# Patient Record
Sex: MALE | Race: WHITE
[De-identification: names, ages, dates, MRNs, and addresses within clinical notes are randomized per-mention and may not be internally consistent; named-entity substitution may affect disease eponyms.]

---

## 2017-03-06 NOTE — CT
CT OF THE CERVICAL SPINE PERFORMED WITHOUT CONTRAST ENHANCEMENT:

 

HISTORY: 

Fall with neck pain.

 

FINDINGS: 

The vertebral bodies are normal in height.  There is disk narrowing at C5-6 and C6-7 and C7-T1.  The
re are moderate degenerative facet changes also present.  There is marked right-sided foraminal narr
owing at C3-4.  Mild bilateral foraminal narrowing at C4-5 and C5-6.  There is fairly pronounced rig
ht-sided foraminal narrowing at C7-T1.  There is no CT evidence of fracture.

 

There are mildly prominent but small bilateral jugular chain lymph nodes, also bilateral intraparoti
d nodes which are not significantly enlarged.  Given the lack of any pathologically enlarged nodes, 
this is of questionable significance.  If the patient has any type of history of cancer, particularl
y a lymphoma,, then they would have to be viewed with more suspicion.

 

IMPRESSION: 

1.  No CT evidence of fracture of the cervical spine.

2.  Slightly numerous nodes including submandibular intraparotid, and jugular chain and posterior tr
iangle nodes, none of which appear pathologically enlarged.

 

POS: [facility]

## 2017-03-06 NOTE — RAD
TWO VIEWS LEFT RIBS

 

ONE VIEW CHEST

 

HISTORY:

A 66-year-old male with left rib pain after a fall out of bed four days ago.

 

FINDINGS:

Heart size is normal.  Nipple shadows overly both lower lung zones.  No pneumothorax or pleural effu
justin.  Nondisplaced fracture of the left ninth lateral rib.

 

IMPRESSION:

Nondisplaced fracture of the left lateral ninth rib.

 

POS: [facility]

## 2017-03-06 NOTE — CT
CT BRAIN WITHOUT CONTRAST:

 

Date:

03/06/17 

 

HISTORY:  

Fall, hit head on nightstand, headache. 

 

FINDINGS:

No evidence of acute infarct, hemorrhage, midline shift, or abnormal extra-axial fluid collections a
re seen. The ventricular size is appropriate and the basilar cisterns are patent. The bony calvarium
 is intact. The visualized paranasal sinuses and mastoid air cells are well aerated. 

 

IMPRESSION: 

No CT evidence of acute intracranial process. 

 

 

 

POS: [facility]

## 2019-01-17 NOTE — CT
CONTRAST ENHANCED CT IMAGES SOFT TISSUE NECK:

 

HISTORY: 

Anterior neck swelling.

 

FINDINGS: 

Contrast-enhanced CT images of the soft tissue neck obtained.  Comparison is made to a previous CT of
 the cervical spine from 3/6/2017.

 

CT images soft tissue neck demonstrate a hypodense mass just to the right of midline seen on axial im
age #76.  Three-dimensional measures measure 2.2 x 4.5 x 3.2 cm.  This lesion appears to be hypodense
.  It is posterior to the strap muscles.  It extends superiorly to the inferior margin of the vallecu
la.  This lesion may represent a thyroglossal duct cyst.  The lesion is posterior to the strap muscle
s and anterior to the thyroid cartilage and inferior to the hyoid bone.  Cystic malignancy including 
squamous cell carcinoma, however, cannot be excluded originating from the vallecula.  No significant 
evidence of lymphadenopathy is seen.  There is an enlarged lymph node in the left superficial posteri
or aspect of the parotid gland measuring 7 mm in minimum dimension.

 

IMPRESSION: 

Midline intrahyoid hypodense lesion possibly representing a thyroglossal duct cyst.  Other cystic gio
plastic process, however, cannot be excluded.  Coronary artery calcifications seen.  Some prominent s
uperficial left parotid lymph nodes seen.  In addition, left carotid bulb vascular calcifications are
 also seen.

 

POS: [facility]

## 2019-02-21 ENCOUNTER — HOSPITAL ENCOUNTER (OUTPATIENT)
Dept: HOSPITAL 92 - SDC | Age: 69
Discharge: HOME | End: 2019-02-21
Attending: OTOLARYNGOLOGY
Payer: MEDICARE

## 2019-02-21 VITALS — BODY MASS INDEX: 33.9 KG/M2

## 2019-02-21 DIAGNOSIS — Z79.1: ICD-10-CM

## 2019-02-21 DIAGNOSIS — Z79.899: ICD-10-CM

## 2019-02-21 DIAGNOSIS — Z79.4: ICD-10-CM

## 2019-02-21 DIAGNOSIS — I10: ICD-10-CM

## 2019-02-21 DIAGNOSIS — E11.40: ICD-10-CM

## 2019-02-21 DIAGNOSIS — Z88.8: ICD-10-CM

## 2019-02-21 DIAGNOSIS — Z21: ICD-10-CM

## 2019-02-21 DIAGNOSIS — Z79.82: ICD-10-CM

## 2019-02-21 DIAGNOSIS — Q89.2: Primary | ICD-10-CM

## 2019-02-21 DIAGNOSIS — Z86.73: ICD-10-CM

## 2019-02-21 LAB
ANION GAP SERPL CALC-SCNC: 14 MMOL/L (ref 10–20)
BUN SERPL-MCNC: 11 MG/DL (ref 8.4–25.7)
CALCIUM SERPL-MCNC: 9.3 MG/DL (ref 7.8–10.44)
CHLORIDE SERPL-SCNC: 105 MMOL/L (ref 98–107)
CO2 SERPL-SCNC: 20 MMOL/L (ref 23–31)
CREAT CL PREDICTED SERPL C-G-VRATE: 116 ML/MIN (ref 70–130)
GLUCOSE SERPL-MCNC: 325 MG/DL (ref 80–115)
HGB BLD-MCNC: 9.6 G/DL (ref 14–18)
PLATELET # BLD AUTO: 115 THOU/UL (ref 130–400)
POTASSIUM SERPL-SCNC: 4.4 MMOL/L (ref 3.5–5.1)
SODIUM SERPL-SCNC: 135 MMOL/L (ref 136–145)

## 2019-02-21 PROCEDURE — 93005 ELECTROCARDIOGRAM TRACING: CPT

## 2019-02-21 PROCEDURE — 93010 ELECTROCARDIOGRAM REPORT: CPT

## 2019-02-21 PROCEDURE — 85018 HEMOGLOBIN: CPT

## 2019-02-21 PROCEDURE — 88305 TISSUE EXAM BY PATHOLOGIST: CPT

## 2019-02-21 PROCEDURE — 85049 AUTOMATED PLATELET COUNT: CPT

## 2019-02-21 PROCEDURE — 85014 HEMATOCRIT: CPT

## 2019-02-21 PROCEDURE — 36415 COLL VENOUS BLD VENIPUNCTURE: CPT

## 2019-02-21 PROCEDURE — 80048 BASIC METABOLIC PNL TOTAL CA: CPT

## 2019-02-21 PROCEDURE — 0WB60ZX EXCISION OF NECK, OPEN APPROACH, DIAGNOSTIC: ICD-10-PCS | Performed by: OTOLARYNGOLOGY

## 2019-02-21 PROCEDURE — 36416 COLLJ CAPILLARY BLOOD SPEC: CPT

## 2019-02-21 NOTE — OP
DATE OF PROCEDURE:  02/21/2019



PREOPERATIVE DIAGNOSIS:  Large thyroglossal duct cyst.



POSTOPERATIVE DIAGNOSIS:  Large thyroglossal duct cyst.



PROCEDURES PERFORMED:  

1. Excision of large thyroglossal duct cyst.

2. Direct laryngoscopy.

3. Sistrunk procedure.



DESCRIPTION OF PROCEDURE:  After consent was obtained, the patient was identified

and brought to operating room and placed on operating table in supine position.

General endotracheal anesthesia was obtained and the patient was positioned for

surgery.  The patient underwent systematic laryngeal examination and no foreign

bodies were identified at the cecum or the tongue base area.  We then proceeded with

prepping and draping the patient and positioning for surgery.  An incision was made

over the cyst in the natural skin crease and carried down through the skin and

subcutaneous tissues until the strap muscles.  Ultimately, the cyst was encountered

and dissected from the surrounding tissues.  The hyoid bone was skeletonized and

transected in the medial portion.  This was then dissected through the midportion of

the hyoid bone back to the base of tongue area.  This was then suture ligated with a

3-0 silk suture.  The marsupialized end of the cyst was then cauterized with the

bipolar cautery.  We then put Fibrillar Surgicel in the deep aspect of the wound,

closed the wounds with the strap muscles being reapproximated followed by the

platysma and subcutaneous tissues.  We then did a subcuticular suture and put a

sterile dressing, awakened the patient, taken to the recovery room in stable

condition prior to discharge to home. 







Job ID:  [id number]

## 2019-02-22 NOTE — EKG
Test Reason : PREOP

Blood Pressure : ***/*** mmHG

Vent. Rate : 100 BPM     Atrial Rate : 100 BPM

   P-R Int : 138 ms          QRS Dur : 098 ms

    QT Int : 364 ms       P-R-T Axes : 048 -27 071 degrees

   QTc Int : 469 ms

 

Normal sinus rhythm

Normal ECG

When compared with ECG of 13-JAN-2017 18:04,

No significant change was found

Confirmed by WILBERT AGUILAR ([id number]) on 2/22/2019 7:05:38 AM

 

Referred By:  LEAH           Confirmed By:WILBERT AGUILAR

## 2019-04-23 ENCOUNTER — HOSPITAL ENCOUNTER (OUTPATIENT)
Dept: HOSPITAL 92 - SDC | Age: 69
Discharge: HOME | End: 2019-04-23
Attending: INTERNAL MEDICINE
Payer: MEDICARE

## 2019-04-23 VITALS — BODY MASS INDEX: 34.2 KG/M2

## 2019-04-23 DIAGNOSIS — K63.5: Primary | ICD-10-CM

## 2019-04-23 DIAGNOSIS — D50.9: ICD-10-CM

## 2019-04-23 DIAGNOSIS — B20: ICD-10-CM

## 2019-04-23 DIAGNOSIS — K72.90: ICD-10-CM

## 2019-04-23 DIAGNOSIS — K57.30: ICD-10-CM

## 2019-04-23 DIAGNOSIS — I10: ICD-10-CM

## 2019-04-23 DIAGNOSIS — Z88.8: ICD-10-CM

## 2019-04-23 DIAGNOSIS — E11.9: ICD-10-CM

## 2019-04-23 DIAGNOSIS — K74.60: ICD-10-CM

## 2019-04-23 PROCEDURE — 88305 TISSUE EXAM BY PATHOLOGIST: CPT

## 2019-04-23 PROCEDURE — 0DBE8ZZ EXCISION OF LARGE INTESTINE, VIA NATURAL OR ARTIFICIAL OPENING ENDOSCOPIC: ICD-10-PCS | Performed by: INTERNAL MEDICINE

## 2019-04-23 PROCEDURE — 0DB58ZX EXCISION OF ESOPHAGUS, VIA NATURAL OR ARTIFICIAL OPENING ENDOSCOPIC, DIAGNOSTIC: ICD-10-PCS | Performed by: INTERNAL MEDICINE

## 2019-04-23 PROCEDURE — 36416 COLLJ CAPILLARY BLOOD SPEC: CPT

## 2019-04-23 NOTE — OP
DATE OF PROCEDURE:  04/23/2019



PROCEDURE PERFORMED:  Esophagogastroduodenoscopy with biopsy and colonoscopy with

snare polypectomy. 



PREOPERATIVE DIAGNOSES:  Iron deficiency anemia and cirrhosis of the liver.



DESCRIPTION OF PROCEDURE:  Informed consent was obtained from the patient.  He was

sedated with total intravenous anesthesia.  The bite block was placed and the

endoscope was advanced easily to the second portion of the duodenum and retroflexion

was performed in the stomach.  The esophagus was normal.  The GE junction was

normal.  The stomach was normal including retroflex views.  The pylorus and first

and second portions of the duodenum were normal.  Biopsies were obtained from the

duodenum to rule out celiac disease.  The patient was turned around.  Rectal exam

was performed and was normal.  The colonoscope was advanced to the terminal ileum

without difficulty.  The mucosa of the terminal ileum was normal.  The ileocecal

valve and appendiceal orifice were clearly identified.  There was mild

diverticulosis in the left colon.  A 5-mm polyp was removed from the descending

colon by cold snare polypectomy.  The remainder of the colonic mucosa was normal

throughout.  Retroflex views in the rectum were unremarkable unremarkable. 



IMPRESSION:  

1. Normal esophagogastroduodenoscopy.  There were no varices.  Duodenal biopsies

were taken to rule out celiac disease as a cause for iron deficiency anemia. 

2. Mild diverticulosis of the left colon.

3. A 5-mm polyp was removed from the descending colon with cold snare polypectomy.

4. Otherwise normal colonoscopy to the terminal ileum.



RECOMMENDATIONS:  

1. Await histopathology.

2. Follow up in GI clinic.

3. Consider capsule endoscopy as an outpatient as a next step.





Job ID:  [id number]

## 2019-06-24 ENCOUNTER — HOSPITAL ENCOUNTER (OUTPATIENT)
Dept: HOSPITAL 18 - NAV ULT | Age: 69
Discharge: HOME | End: 2019-06-24
Payer: MEDICARE

## 2019-06-24 DIAGNOSIS — K74.60: ICD-10-CM

## 2019-06-24 DIAGNOSIS — K72.90: Primary | ICD-10-CM

## 2019-06-24 DIAGNOSIS — Z90.49: ICD-10-CM

## 2019-06-24 PROCEDURE — 76705 ECHO EXAM OF ABDOMEN: CPT

## 2019-06-24 NOTE — ULT
RIGHT UPPER QUADRANT ULTRASOUND:

 

HISTORY: 

Cirrhosis of the liver.

 

FINDINGS: 

The liver demonstrates increased echogenicity concerning for fatty infiltration without focal mass or
 intrahepatic ductal dilatation.  The patient is post cholecystectomy.  The common duct measures 7 mm
 in diameter.  The visualized portions of the pancreas and right kidney are normal.  There is normal 
flow and spectral waveforms in the portal and hepatic vasculature.  No free fluid is seen in the righ
t upper quadrant.

 

IMPRESSION: 

1.  Fatty liver without hepatic mass.

 

2.  Status post cholecystectomy.

 

POS: OFF

## 2019-10-25 ENCOUNTER — HOSPITAL ENCOUNTER (OUTPATIENT)
Dept: HOSPITAL 92 - SCSULT | Age: 69
Discharge: HOME | End: 2019-10-25
Attending: INTERNAL MEDICINE
Payer: MEDICARE

## 2019-10-25 DIAGNOSIS — D50.9: ICD-10-CM

## 2019-10-25 DIAGNOSIS — K72.90: ICD-10-CM

## 2019-10-25 DIAGNOSIS — R16.2: ICD-10-CM

## 2019-10-25 DIAGNOSIS — K76.0: ICD-10-CM

## 2019-10-25 DIAGNOSIS — B20: ICD-10-CM

## 2019-10-25 DIAGNOSIS — E11.9: ICD-10-CM

## 2019-10-25 DIAGNOSIS — K74.60: Primary | ICD-10-CM

## 2019-10-25 DIAGNOSIS — Z90.49: ICD-10-CM

## 2019-10-25 PROCEDURE — 76705 ECHO EXAM OF ABDOMEN: CPT

## 2019-10-25 NOTE — ULT
EXAM:

US Hepatic Doppler



PROVIDED CLINICAL HISTORY:

Cirrhosis. History of prior cholecystectomy.



COMPARISON:

None



FINDINGS:

The majority of pancreas is obscured by bowel gas; however, where visualized, pancreas has a grossly 
normal sonographic appearance. Abdominal aorta is also mostly obscured due to shadowing from bowel

gas. The mid abdominal aorta is visualized and normal in caliber. 



There is increased echogenicity of the liver suggesting fatty infiltration. The liver is enlarged bravo
suring 21.3 cm in craniocaudal dimensions. No obvious hepatic lesion is seen.



The gallbladder is not visualized compatible with patient's history of prior cholecystectomy. The com
mon duct is normal in caliber measuring 0.3 cm in diameter.



The spleen is enlarged measuring 17 cm in length.



Hepatic Doppler evaluation with spectral analysis and color flow evaluation:

There is normal directional flow seen within the hepatic, portal, and splenic veins. Arterial wavefor
ms are demonstrated within the splenic and hepatic arteries.



IMPRESSION:



1. Hepatosplenomegaly.

2. Fatty infiltration of the liver which does limit evaluation of the hepatic parenchyma. However, no
 definite lesion is seen.

3. Postcholecystectomy changes. The common duct is normal in caliber.

4. Normal directional flow with thin the splenic, hepatic, and portal veins.



Reported By: Herve Haq 

Electronically Signed:  10/25/2019 9:32 AM

## 2019-10-31 ENCOUNTER — HOSPITAL ENCOUNTER (OUTPATIENT)
Dept: HOSPITAL 92 - SCSRAD | Age: 69
Discharge: HOME | End: 2019-10-31
Attending: INTERNAL MEDICINE
Payer: MEDICARE

## 2019-10-31 DIAGNOSIS — Z90.49: ICD-10-CM

## 2019-10-31 DIAGNOSIS — D64.9: Primary | ICD-10-CM

## 2019-10-31 DIAGNOSIS — K59.00: ICD-10-CM

## 2019-10-31 PROCEDURE — 74018 RADEX ABDOMEN 1 VIEW: CPT

## 2019-10-31 NOTE — RAD
ABDOMEN ONE VIEW:

10/31/19

 

HISTORY: 

Capsule endoscopy. 

 

FINDINGS:

Large amount of stool throughout the colon. Phleboliths project over the pelvis. Metallic clips over 
the gallbladder fossa. No metallic foreign bodies are otherwise demonstrated. Degenerative changes of
 the hips and lumbar spine. 

 

IMPRESSION: 

Metallic camera not visible within the abdomen. 

 

Constipation. 

 

Status post cholecystectomy. 

 

POS: [facility]

## 2019-11-14 ENCOUNTER — HOSPITAL ENCOUNTER (INPATIENT)
Dept: HOSPITAL 92 - ERS | Age: 69
LOS: 3 days | Discharge: HOME | DRG: 280 | End: 2019-11-17
Attending: FAMILY MEDICINE | Admitting: FAMILY MEDICINE
Payer: MEDICARE

## 2019-11-14 ENCOUNTER — HOSPITAL ENCOUNTER (EMERGENCY)
Dept: HOSPITAL 18 - NAV ERS | Age: 69
Discharge: ANOTHER HOSPITAL | End: 2019-11-14
Payer: MEDICARE

## 2019-11-14 VITALS — BODY MASS INDEX: 37.9 KG/M2

## 2019-11-14 DIAGNOSIS — Z79.899: ICD-10-CM

## 2019-11-14 DIAGNOSIS — E11.22: ICD-10-CM

## 2019-11-14 DIAGNOSIS — E78.5: ICD-10-CM

## 2019-11-14 DIAGNOSIS — I11.0: ICD-10-CM

## 2019-11-14 DIAGNOSIS — K75.81: ICD-10-CM

## 2019-11-14 DIAGNOSIS — K74.60: ICD-10-CM

## 2019-11-14 DIAGNOSIS — D64.9: ICD-10-CM

## 2019-11-14 DIAGNOSIS — Z90.49: ICD-10-CM

## 2019-11-14 DIAGNOSIS — Z66: ICD-10-CM

## 2019-11-14 DIAGNOSIS — K21.9: ICD-10-CM

## 2019-11-14 DIAGNOSIS — Z21: ICD-10-CM

## 2019-11-14 DIAGNOSIS — Z85.46: ICD-10-CM

## 2019-11-14 DIAGNOSIS — Z90.79: ICD-10-CM

## 2019-11-14 DIAGNOSIS — N18.2: ICD-10-CM

## 2019-11-14 DIAGNOSIS — I50.31: ICD-10-CM

## 2019-11-14 DIAGNOSIS — Z79.4: ICD-10-CM

## 2019-11-14 DIAGNOSIS — I13.0: ICD-10-CM

## 2019-11-14 DIAGNOSIS — N17.9: ICD-10-CM

## 2019-11-14 DIAGNOSIS — I25.10: ICD-10-CM

## 2019-11-14 DIAGNOSIS — Z79.82: ICD-10-CM

## 2019-11-14 DIAGNOSIS — I21.4: Primary | ICD-10-CM

## 2019-11-14 DIAGNOSIS — Z79.84: ICD-10-CM

## 2019-11-14 DIAGNOSIS — I50.9: ICD-10-CM

## 2019-11-14 DIAGNOSIS — J96.01: ICD-10-CM

## 2019-11-14 DIAGNOSIS — E66.9: ICD-10-CM

## 2019-11-14 DIAGNOSIS — E11.42: ICD-10-CM

## 2019-11-14 LAB
ALBUMIN SERPL BCG-MCNC: 4.2 G/DL (ref 3.4–4.8)
ALP SERPL-CCNC: 112 U/L (ref 40–110)
ALT SERPL W P-5'-P-CCNC: 31 U/L (ref 8–55)
ANION GAP SERPL CALC-SCNC: 17 MMOL/L (ref 10–20)
APTT PPP: 29.8 SEC (ref 22.9–36.1)
AST SERPL-CCNC: 31 U/L (ref 5–34)
BASOPHILS # BLD AUTO: 0.1 THOU/UL (ref 0–0.2)
BASOPHILS NFR BLD AUTO: 0.7 % (ref 0–1)
BILIRUB SERPL-MCNC: 0.5 MG/DL (ref 0.2–1.2)
BUN SERPL-MCNC: 30 MG/DL (ref 8.4–25.7)
CALCIUM SERPL-MCNC: 9.6 MG/DL (ref 7.8–10.44)
CHLORIDE SERPL-SCNC: 105 MMOL/L (ref 98–107)
CK MB SERPL-MCNC: 14.6 NG/ML (ref 0–6.6)
CO2 SERPL-SCNC: 19 MMOL/L (ref 23–31)
CREAT CL PREDICTED SERPL C-G-VRATE: 0 ML/MIN (ref 70–130)
EOSINOPHIL # BLD AUTO: 0.1 THOU/UL (ref 0–0.7)
EOSINOPHIL NFR BLD AUTO: 0.7 % (ref 0–10)
GLOBULIN SER CALC-MCNC: 3.6 G/DL (ref 2.4–3.5)
GLUCOSE SERPL-MCNC: 66 MG/DL (ref 80–115)
HELMET CELLS BLD QL SMEAR: (no result) (100X)
HGB BLD-MCNC: 7.6 G/DL (ref 14–18)
INR PPP: 1.1
LYMPHOCYTES # BLD AUTO: 1.2 THOU/UL (ref 1.2–3.4)
LYMPHOCYTES NFR BLD AUTO: 11.2 % (ref 21–51)
MCH RBC QN AUTO: 18.1 PG (ref 27–31)
MCV RBC AUTO: 63.7 FL (ref 78–98)
MDIFF COMPLETE?: YES
MICROCYTES BLD QL SMEAR: (no result) (100X)
MONOCYTES # BLD AUTO: 0.7 THOU/UL (ref 0.11–0.59)
MONOCYTES NFR BLD AUTO: 6.1 % (ref 0–10)
NEUTROPHILS # BLD AUTO: 8.8 THOU/UL (ref 1.4–6.5)
NEUTROPHILS NFR BLD AUTO: 81.2 % (ref 42–75)
OVALOCYTES BLD QL SMEAR: (no result) (100X)
PLATELET # BLD AUTO: 143 THOU/UL (ref 130–400)
POTASSIUM SERPL-SCNC: 4 MMOL/L (ref 3.5–5.1)
PROTHROMBIN TIME: 13.9 SEC (ref 12–14.7)
RBC # BLD AUTO: 4.17 MILL/UL (ref 4.7–6.1)
REFLEX FOR REVIEW??: NO
SODIUM SERPL-SCNC: 137 MMOL/L (ref 136–145)
TROPONIN I SERPL DL<=0.01 NG/ML-MCNC: 1.28 NG/ML (ref ?–0.03)
WBC # BLD AUTO: 10.8 THOU/UL (ref 4.8–10.8)

## 2019-11-14 PROCEDURE — 90670 PCV13 VACCINE IM: CPT

## 2019-11-14 PROCEDURE — 80053 COMPREHEN METABOLIC PANEL: CPT

## 2019-11-14 PROCEDURE — 99285 EMERGENCY DEPT VISIT HI MDM: CPT

## 2019-11-14 PROCEDURE — 36416 COLLJ CAPILLARY BLOOD SPEC: CPT

## 2019-11-14 PROCEDURE — 86361 T CELL ABSOLUTE COUNT: CPT

## 2019-11-14 PROCEDURE — 71045 X-RAY EXAM CHEST 1 VIEW: CPT

## 2019-11-14 PROCEDURE — 86901 BLOOD TYPING SEROLOGIC RH(D): CPT

## 2019-11-14 PROCEDURE — 36415 COLL VENOUS BLD VENIPUNCTURE: CPT

## 2019-11-14 PROCEDURE — 85007 BL SMEAR W/DIFF WBC COUNT: CPT

## 2019-11-14 PROCEDURE — 83036 HEMOGLOBIN GLYCOSYLATED A1C: CPT

## 2019-11-14 PROCEDURE — 93306 TTE W/DOPPLER COMPLETE: CPT

## 2019-11-14 PROCEDURE — 96372 THER/PROPH/DIAG INJ SC/IM: CPT

## 2019-11-14 PROCEDURE — 85730 THROMBOPLASTIN TIME PARTIAL: CPT

## 2019-11-14 PROCEDURE — 87536 HIV-1 QUANT&REVRSE TRNSCRPJ: CPT

## 2019-11-14 PROCEDURE — 85610 PROTHROMBIN TIME: CPT

## 2019-11-14 PROCEDURE — 86850 RBC ANTIBODY SCREEN: CPT

## 2019-11-14 PROCEDURE — 84484 ASSAY OF TROPONIN QUANT: CPT

## 2019-11-14 PROCEDURE — 94760 N-INVAS EAR/PLS OXIMETRY 1: CPT

## 2019-11-14 PROCEDURE — 83880 ASSAY OF NATRIURETIC PEPTIDE: CPT

## 2019-11-14 PROCEDURE — G0009 ADMIN PNEUMOCOCCAL VACCINE: HCPCS

## 2019-11-14 PROCEDURE — 85027 COMPLETE CBC AUTOMATED: CPT

## 2019-11-14 PROCEDURE — 85025 COMPLETE CBC W/AUTO DIFF WBC: CPT

## 2019-11-14 PROCEDURE — 86900 BLOOD TYPING SEROLOGIC ABO: CPT

## 2019-11-14 PROCEDURE — 85048 AUTOMATED LEUKOCYTE COUNT: CPT

## 2019-11-14 PROCEDURE — 36430 TRANSFUSION BLD/BLD COMPNT: CPT

## 2019-11-14 PROCEDURE — 93005 ELECTROCARDIOGRAM TRACING: CPT

## 2019-11-14 PROCEDURE — P9016 RBC LEUKOCYTES REDUCED: HCPCS

## 2019-11-14 PROCEDURE — 82553 CREATINE MB FRACTION: CPT

## 2019-11-14 PROCEDURE — 90471 IMMUNIZATION ADMIN: CPT

## 2019-11-14 PROCEDURE — 93798 PHYS/QHP OP CAR RHAB W/ECG: CPT

## 2019-11-14 PROCEDURE — 82140 ASSAY OF AMMONIA: CPT

## 2019-11-14 NOTE — RAD
FRONTAL VIEW CHEST:

11/14/19

 

COMPARISON: 

7/5/16. 

 

INDICATIONS:

Short of breath.

 

FINDINGS: 

There is interstitial prominence of the lungs bilaterally. Cardiac silhouette is prominent as is pulm
onary vasculature.  There is obscuration of the left lateral costophrenic sulcus. This could relate t
o small volume pleural fluid.

 

IMPRESSION: 

Findings favor fluid overload related to CHF. Correlate clinically. Imaging follow-up may be obtained
 for continued assessment. 

 

POS: OFF

## 2019-11-14 NOTE — PDOC.FPRHP
- History of Present Illness


Chief Complaint: CP


History of Present Illness: 


68yo CM with h/o CAD, DMII, HARRIS, HIV who presents for CP and SOB. Pt states he 

has had about 3 days of UE and LE edema. Then noticed yesterday he had 

increased SOB having to use his walker to get around. Then noticed new onset 

substernal chest pain described as a pressure and radiation down his L arm. He 

has not been able to lie down all night. Could not move more than 5 feet 

without gasping for air. Associated diaphoresis and flushing. Does endorse some 

dizziness/lightheadedness. CP has since resolved and SOB and edema improved 

with lasix given in ED.





Pt reports being chronically anemic. Has been seeing Dr. Ruddy Adair with GI. 

Reports doing upper and lower endoscopy and recently capsule endoscopy. has not 

found anything


Cardiologist- Dr. Sam


ED Course: 


Given ASA. Th lovenox. 








- Allergies/Adverse Reactions


 Allergies











Allergy/AdvReac Type Severity Reaction Status Date / Time


 


nitroglycerin Allergy  has Verified 04/22/19 14:13





   opposite  





   affect  














- Home Medications


 











 Medication  Instructions  Recorded  Confirmed  Type


 


Meloxicam [Mobic] 15 mg PO HS 07/05/16 11/15/19 History


 


Omeprazole 20 mg PO DAILY 07/05/16 11/15/19 History


 


hydrOXYzine Pamoate [Vistaril] 40 mg PO HS 07/05/16 11/15/19 History


 


metFORMIN [Glucophage] 500 mg PO BID- 07/05/16 11/15/19 History


 


Aspirin [Ecotrin] 81 mg PO DAILY 02/20/19 11/15/19 History


 


Darunavir Ethanolate [Prezista] 1 tab PO DAILY 02/20/19 11/15/19 History


 


Gabapentin 1 tab PO BID 02/20/19 11/15/19 History


 


HumaLOG 50 unit SC BID 02/20/19 11/15/19 History


 


Icosapent Ethyl [Vascepa] 2 tab PO DAILY 02/20/19 11/15/19 History


 


Insulin Glargine [Lantus Vial] 70 units SC BID 02/20/19 11/15/19 History


 


Lactulose 10 gm PO TID 02/20/19 11/15/19 History


 


Raltegravir Potassium [Isentress] 400 mg PO BID 02/20/19 11/15/19 History


 


Rifaximin [Xifaxan] 550 mg PO BID 02/20/19 04/22/19 History


 


Ritonavir 100 mg PO DAILY 02/20/19 11/15/19 History











Comments: 





70 lantus morning and night





Generlac 10g/15ml (20mg after meals)


Vascepa 1 gm BID


Gabapentin 600mg BID


Omeprazole 20 mg once daily


Amitriptyline 100mg in am 150 mg in pm


Metformin 500mg BID


Atorvastatin 40 mg daily


Ritonavir 100 mg once a day


Isentress 400 mg BID


Escitalopram 20 mg a day


ASA 81 mg daily


Alprazolam .5mg daily


Prezista 800 mg daily


Hydroxyzine Pamoate 40 mg a day


Meloxicam 15 mg at night


Humalog 


Lantus





- History


PMHx: Chronic Anemia, Heart Cath (2018) had one vessel completely occluded (no 

stents), DMII on insulin, HTN, HLD, Cirrhosis 2/2 fatty liver, HIV


 


PSHx: Heart Cath (2018), Prostatectomy for Prostate Cancer, Cholecystectomy, 

Tonsilectomy, Hemorrhoidectomy





FHx: Heart Dz, Dad- Pancreatic, Prostate Cancer


 


Social: social drinker. no tob or illicits. Lives alone in [place].


 








- Review of Systems


General: reports: weight/appetite/sleep changes (unable to sleep last night), 

fatigue.  denies: fever/chills, night sweats


Eyes: reports: vision changes (reports blurry vision).  denies: eye pain


ENT: denies: nasal congestion, rhinorrhea


Respiratory: reports: cough, shortness of breath, exercise intolerance.  denies

: congestion


Cardiovascular: reports: chest pain, edema (in arms and legs a few days ago), 

paroxysmal nocturnal dyspnea, orthopnea.  denies: palpitation


Gastrointestinal: reports: constipation (chronic problem).  denies: vomiting, 

diarrhea, abdominal pain, GI bleeding


Genitourinary: reports: other (reports orange colored urine).  denies: 

incontinence, dysuria, polyuria


Skin: denies: rashes, lesions, jaundice


Musculoskeletal: denies: pain, tenderness, stiffness, swelling


Neurological: denies: numbness, weakness


Psychological: denies: anxiety, depression





- Vital signs


BP: [170/80]  HR: [102] RR: [18] Tmax: [98.1] Pox: [100]% on [3L]  Wt: [124 kg]

   








- Physical Exam


Constitutional: NAD, awake, alert and oriented, well developed


HEENT: normocephalic and atraumatic, EOMI, conjunctiva clear, grossly normal 

vision, grossly normal hearing, MMM, oropharynx clear


Neck: supple, trachea midline, no LAD, no JVD


Chest: no-tender to palpation


Heart: RRR, normal S1/S2, no murmurs/rubs/gallops, pulses present, other (1+ 

pitting edema to BL LE to below shins.. Swelling of BL UE of hands.)


Lungs: CTAB, no respiratory distress, no rales/rhonchi, no wheezing, other (

decreased airmovement throughout)


Abdomen: soft, non-tender, bowel sounds present


Musculoskeletal: normal structure, normal tone


Neurological: no focal deficit


Skin: no rash/lesions


Psychiatric: normal mood and affect, good judgment and insight, intact recent 

and remote memory





FMR H&P: Results





- Labs


Result Diagrams: 


 11/15/19 02:10





 11/15/19 02:10





- Radiology Interpretation


  ** Chest x-ray


Status: image reviewed by me, report reviewed by me (Findings favor fluid 

overload related to CHF. Correlate clnically.)





[facility]MR H&P: A/P





- Problem List


(1) NSTEMI (non-ST elevated myocardial infarction)


Current Visit: Yes   Status: Acute   Code(s): I21.4 - NON-ST ELEVATION (NSTEMI) 

MYOCARDIAL INFARCTION   





(2) Acute respiratory failure with hypoxia


Current Visit: Yes   Status: Acute   Code(s): J96.01 - ACUTE RESPIRATORY 

FAILURE WITH HYPOXIA   





(3) CAD (coronary artery disease)


Current Visit: Yes   Status: Chronic   Code(s): I25.10 - ATHSCL HEART DISEASE 

OF NATIVE CORONARY ARTERY W/O ANG PCTRS   





(4) HIV (human immunodeficiency virus infection)


Current Visit: No   Status: Chronic   





(5) T2DM (type 2 diabetes mellitus)


Current Visit: No   Status: Chronic   





- Plan


68yo CM with h/o CAD, anemia, HTD, HARRIS, HIV with undetectable viral load who 

presents with CP found to have NSTEMI.





#NSTEMI, possibly 2/2 demand ischemia


- Trop 1.2 -> 1.8 in ED


- EKG with slight ST depression in lateral V4-V6 leads


- CP resolved in ED


- h/o CAD with cath in early 2019 with complete occlusion of 1 artery and 40% 

stenosis in 2 others per pt family


- Th lovenox at 1mg/kg BID


- known to Dr. Sam


- Consult Cards in AM


- NPO at midnight for likely cardiac intervention


- Admit to Tele for continuous monitoring, trending trops





#Symptomatic Anemia


- Hb 7.5 on admission, lowest in records reviewed


- Denies any acute blood loss


- Known to Dr. Adair, recent upper, lower, and capsule endoscopy - clear per pt


- sxs of dizziness/lightheadedness and NSTEMI


- Type and cross and transfuse 1u pRBC followed by 40mg IV lasix


- Repeat HB in AM, will cont to monitor on tele


- will obtain FOBT





#Acute Hypoxic respiratory failure 2/2 NSTEMI and likely CHF exacerbation


- O2 sat of 88 on presentation to ED, improved with diuresis with IV lasix


- Satting well on RA now, will cont to monitor closely





#CHF exacerbation


- newonset possibly contributing to NSTEMI


- Will order Echo in AM


- Lasix 80mg IV in ED x2, will monitor with strict I's and O's and daily weights


- Cards consult in AM


- CXR with mild pulmonary congestion





#DMII


- Will place on SS insulin as pt using long-acting Lantus prn at home and 

currently NPO


- Hyperglycemic protocol with IVAN cordoba





#HIV


- undetectable viral load per pt


- cont home medications


- follows with Dr. Weiss





PCP: [facility]





Code: modified DNR - chest compression only, no intubation - spoke with pt and 

sister at beside


Diet: NPO at midnight


IVF: SL


VTE: Th lovenox








Disposition/LOS: 


Admit to tele for NSTEMI, suspected acute CHF exacerbation, acute symptomatic 

anemia. Cards consult in AM. Trend trops. Diuresis and echo in AM. Anticipate 

hospitalization > 48 hours.








FMR H&P: Upper Level





- Pertinent history





I was present with Dr. BYRON Martin during the HPI. I scribed the above document. I 

made edits as needed. 





- Pertinent findings





General: Pt sitting up in Bed. O2 sats stable on 3 L O2


Cardio: RRR, no murmurs or gallops. 


Resp: Bilateral crackles noted, No wheezes


Abdomen: Mildly distended. No masses or hernias. NTTP


Ext: +3 Pitting edema up to his knees in LE bilaterally 





- Plan


Date/Time: 11/14/19 2210








I, Zak Traore, PGY-3,  have evaluated this patient and agree with findings/

plan as outlined by intern resident. Pertinent changes/additions are listed 

here. See above for detailed plan. I made edits above as needed. At this time 

we will admit pt for NSTEMI. Pt started on therapeutic lovenox. No longer 

having chest pain. Will continue to trend trops. Will consult cardiology in the 

AM. Pt also appears to have signs of new onset CHF. Will get ECHO. Will start 

IV Lasix 40 mg. CXR shows signs of fluid overload. Likely cause of SOB. Pt also 

has acute hypoxic resp failure 2/2 fluid overload. Will continue to tx with 

lasix. Pt has history of chronic anemia. Pt hgb  7.5 this is lower from prior 

values. Baseline around 8-9. Possible Blood loss leading to some demand 

ischemia above. Will get FOBT. Will trend CBC. Will transfuse 1u PRBC at this 

time. Pt has been seeing GI- Dr. Adair and is currently being worked up for 

this. Pt has HIV. Will continue home meds. Pt has insulin dependent DMII. Pt 

reports home lantus dose 70u. Pt glucose stable at this time. Will hold lantus 

for now and trend glucose as he is NPO. Placed on Mod SSI. See above for 

detailed plan. 








Addendum - Attending





- Attending Attestation


Date/Time: 11/14/19 [id number]





I personally evaluated the patient and discussed the management with Dr. Martin/

eLón


I agree with the History, Examination, Assessment and Plan documented above 

with any addition or exceptions noted below.





68 yo WM PMH HTN, known CAD with 40-50% occlusion per family, DM2, and HIV (

well controlled) presents with 1-2 wk hx of worsening SOB that acutely worsened 

this morning. Reports difficulty lying flat. Seen at [facility] ER. Found to 

have NSTEMI and HF exacerbation. Unknown if has hx of HF at this time. Exam 

unremarkable. Resting comfortably in room. patient states supplemental oxygen 

has helped symptoms. Trop 0.5->1.2. . CXR fluid overload. initial EKG 

unremarkable with borderline QTc prolongation. Repeat EKG unchanged. States 

only has CP with deep inspiration. Will admit to inpatient telemetry for NSTEMI 

type 1 and likely new onset heart failure. s/p lasix and therapeutic lovenox. 

Type, crossmatch, and transfuse 1 unit for goal hemoglobin of 9. Will consult 

cardiology in the morning for likely cath. PRN nitro for CP. Repeat EKG if 

chest pain worsens. See intern note for chronic problems.

## 2019-11-15 LAB
ALBUMIN SERPL BCG-MCNC: 3.9 G/DL (ref 3.4–4.8)
ALP SERPL-CCNC: 111 U/L (ref 40–110)
ALT SERPL W P-5'-P-CCNC: 28 U/L (ref 8–55)
ANION GAP SERPL CALC-SCNC: 12 MMOL/L (ref 10–20)
AST SERPL-CCNC: 31 U/L (ref 5–34)
BILIRUB SERPL-MCNC: 0.5 MG/DL (ref 0.2–1.2)
BUN SERPL-MCNC: 29 MG/DL (ref 8.4–25.7)
CALCIUM SERPL-MCNC: 8.8 MG/DL (ref 7.8–10.44)
CHLORIDE SERPL-SCNC: 104 MMOL/L (ref 98–107)
CO2 SERPL-SCNC: 26 MMOL/L (ref 23–31)
COMM CRITICAL RESULTS DOC: (no result)
CREAT CL PREDICTED SERPL C-G-VRATE: 101 ML/MIN (ref 70–130)
CREAT CL PREDICTED SERPL C-G-VRATE: 118 ML/MIN (ref 70–130)
GLOBULIN SER CALC-MCNC: 3.4 G/DL (ref 2.4–3.5)
GLUCOSE SERPL-MCNC: 134 MG/DL (ref 80–115)
HGB BLD-MCNC: 7.5 G/DL (ref 14–18)
HGB BLD-MCNC: 8 G/DL (ref 14–18)
MCH RBC QN AUTO: 19.2 PG (ref 27–31)
MCV RBC AUTO: 63.9 FL (ref 78–98)
MDIFF COMPLETE?: YES
PLATELET # BLD AUTO: 117 THOU/UL (ref 130–400)
PLATELET # BLD AUTO: 96 THOU/UL (ref 130–400)
POTASSIUM SERPL-SCNC: 3.6 MMOL/L (ref 3.5–5.1)
RBC # BLD AUTO: 3.9 MILL/UL (ref 4.7–6.1)
SODIUM SERPL-SCNC: 138 MMOL/L (ref 136–145)
TROPONIN I SERPL DL<=0.01 NG/ML-MCNC: 1.18 NG/ML (ref ?–0.03)
TROPONIN I SERPL DL<=0.01 NG/ML-MCNC: 1.84 NG/ML (ref ?–0.03)
WBC # BLD AUTO: 7.9 THOU/UL (ref 4.8–10.8)

## 2019-11-15 RX ADMIN — ASPIRIN SCH MG: 81 TABLET ORAL at 11:37

## 2019-11-15 NOTE — PDOC.FM
- Subjective


Subjective: 





Pt reports that his breathing is much improved. He denies chest pain currently. 

In addition, he denies nausea, vomiting, diaphoresis, or SOB. He states the O2 

has greatly improved his breathing.





- Objective


MAR Reviewed: Yes


Vital Signs & Weight: 


 Vital Signs (12 hours)











  Temp Pulse Pulse Resp BP BP Pulse Ox


 


 11/15/19 03:17  98.8 F   92  24 H  135/68   95


 


 11/14/19 23:00  98.9 F  80   24 H   170/80 H  97








 Weight











Weight                         124.194 kg














I&O: 


 











 11/13/19 11/14/19 11/15/19





 06:59 06:59 06:59


 


Intake Total   0


 


Balance   0











Result Diagrams: 


 11/15/19 09:16





 11/15/19 09:16





Phys Exam





- Physical Examination


Constitutional: NAD


dry mm


Neck: no JVD


Respiratory: no wheezing


Crackles in bilateral bases and mid lung


Cardiovascular: RRR, no significant murmur


Gastrointestinal: soft, non-tender, no distention, positive bowel sounds


Musculoskeletal: pulses present, edema present (1+ pitting edema to knee 

bilaterally)


Neurological: moves all 4 limbs


Psychiatric: A&O x 3


Skin: cap refill <2 seconds





Dx/Plan


(1) PORFIRIO (acute kidney injury)


Code(s): N17.9 - ACUTE KIDNEY FAILURE, UNSPECIFIED   Status: Acute   





(2) HIV (human immunodeficiency virus infection)


Status: Chronic   





(3) HTN (hypertension)


Code(s): I10 - ESSENTIAL (PRIMARY) HYPERTENSION   Status: Acute   





(4) T2DM (type 2 diabetes mellitus)


Status: Chronic   





- Plan


Plan: 





This is a 70 yo male with a pmh of Chronic anemia, IDDM2, HTN, CAD, HLD, 

Cirrhosis 2/2 fatty liver, HIV








Acute hypoxic respiratory failure likely 2/2 new CHF exacerbation


-S/P lasix 80mg in outside hospital


-


-Undergoing diuresis with lasix. Strict I&Os and daily weights


-Plan for AM echo


-Plan for cardiology consult given apparent new onset CHF


-Pt will likely need beta blocker and ACEi before discharge





NSTEMI type 1


-May be contributing to the new CHF


-S/P therapeutic lovenox and aspirin


-ST depressions in lateral leads


-Plan for cardiology consult in the AM


-Troponins trending up:0.502, 1.276, 1.845


-Will continue monitoring for pain and repeat EKG with any changes


-Dr. Sam is pt's cardiologist





PORFIRIO on CKD 2 likely 2/2 new CHF exacerbation


-Diuresis and follow with BMPs





Symptomatic anemia


-Transfusion threshold is <8.0 due to cardiac condition


-S/P 1u PRBCs, will reassess after


-Will monitor respirations closely for fluid overload, pt given 40mg lasix 

before transfusion





Chronic anemia


-Pt is undergoing workup with Dr. Adair. No source at this point


-MCV would suggest iron deficiency, however RDW is elevated suggesting 

multifocal anemia or large reticulocyte population





IDDM2


-Continue home 





HTN


-Monitor BP, no meds on home list





HIV


-Pt reports undetectable viral load, pending viral load and CD4 count


-Continue home raltegravir and ritonavir





CAD


-Continue aspirin, starting atorvastatin





Cirrhosis 2/2 fatty liver


-Check on hep A and B vaccine








Addendum - Attending





- Attending Attestation


Date/Time: 11/15/19 1043





I personally evaluated the patient and discussed the management with Dr. Sage.


I agree with the History, Examination, Assessment and Plan documented above 

with any addition or exceptions noted below.

## 2019-11-15 NOTE — CON
DATE OF CONSULTATION:  



HISTORY OF PRESENT ILLNESS:  The patient is a very pleasant 69-year-old 
gentleman with a history of coronary artery disease, who presented with dyspnea 
and chest

discomfort.  The patient has a previous history of coronary artery disease. In 
February 2019, the patient underwent a left heart catheterization.  He was 
found to

have normal left ventricular systolic function, estimated ejection fraction of 
55% to 60%.  The LAD had a 50% mid stenosis.  There was a distal 40% stenosis 
in the

LAD.  The left circumflex artery had a 40% ostial stenosis.  Right coronary 
artery had 100% proximal occlusion.  The patient has subsequently been on 
medical therapy.

He has also had a great difficulty with GI hemorrhage.  He has been undergoing 
an evaluation.  The patient was in his usual state of health when he noted 
having

increasing edema.  He subsequently became progressively short of breath.  He 
reported having chest discomfort associated with his breathing.  The patient 
denies

having any present chest discomfort. 



PAST MEDICAL HISTORY:  

1. Coronary artery disease.

2. Diabetes mellitus.

3. HIV positive.

4. Prostate carcinoma.

5. Hypertension.

6. Dyslipidemia.

7. Obesity.



PAST SURGICAL HISTORY:  

1. Cholecystectomy.

2. Prostatectomy.



SOCIAL HISTORY:  Former smoker.



ALLERGIES:   NO KNOWN DRUG ALLERGIES.



MEDICATIONS:  See nursing list.



FAMILY HISTORY:  Positive family history of coronary artery disease.



REVIEW OF SYSTEMS:  Ten-point system otherwise unremarkable.



PHYSICAL EXAMINATION:

GENERAL:  This is an obese gentleman, in no acute distress. 

VITAL SIGNS:  Blood pressure of 143/75. 

NECK:  No jugular venous distention. 

LUNGS:  Crackles in both bases. 

HEART:  Regular rate and rhythm.  Normal S1 and S2. 

ABDOMEN:  Moderately distended. 

EXTREMITIES:  2+ bilateral edema. 

VASCULAR:  Radial pulses 2+.



LABORATORY DATA:  White blood cell count was 7.9, hemoglobin 7.5, hematocrit 
24.9,

and his platelets are 117.  Sodium 138, potassium 3.6, chloride 104, bicarb 26, 
BUN

29, creatinine 1.2.  Troponin 1.1. 



His EKG reveals normal sinus rhythm with ST abnormality suggestive of lateral 
ischemia. 



IMPRESSION AND PLAN:  

1. Non Q-wave myocardial infarction.

2. 3-vessel coronary artery disease.

3. History of GI hemorrhage.

4. Severe anemia.

5. Congestive heart failure probably secondary to diastolic dysfunction.

6. Diabetes mellitus.

7. Obesity.  





This patient presents with a small non-Q-wave myocardial infarction, probably 
due to demand ischemia and congestive heart failure.  From a cardiac standpoint
, we would

treat the patient medically.  We would recommend starting the patient on 
diuretics. We will follow this patient with you through his hospitalization. 





Job ID:  [id number]



MTDD

## 2019-11-16 LAB
ALBUMIN SERPL BCG-MCNC: 3.7 G/DL (ref 3.4–4.8)
ALP SERPL-CCNC: 107 U/L (ref 40–110)
ALT SERPL W P-5'-P-CCNC: 28 U/L (ref 8–55)
ANION GAP SERPL CALC-SCNC: 9 MMOL/L (ref 10–20)
AST SERPL-CCNC: 29 U/L (ref 5–34)
BILIRUB SERPL-MCNC: 0.6 MG/DL (ref 0.2–1.2)
BUN SERPL-MCNC: 21 MG/DL (ref 8.4–25.7)
CALCIUM SERPL-MCNC: 8.7 MG/DL (ref 7.8–10.44)
CHLORIDE SERPL-SCNC: 104 MMOL/L (ref 98–107)
CO2 SERPL-SCNC: 27 MMOL/L (ref 23–31)
CREAT CL PREDICTED SERPL C-G-VRATE: 121 ML/MIN (ref 70–130)
GLOBULIN SER CALC-MCNC: 3.4 G/DL (ref 2.4–3.5)
GLUCOSE SERPL-MCNC: 124 MG/DL (ref 80–115)
HGB BLD-MCNC: 7.9 G/DL (ref 14–18)
MCH RBC QN AUTO: 19.9 PG (ref 27–31)
MCV RBC AUTO: 66 FL (ref 78–98)
MDIFF COMPLETE?: YES
PLATELET # BLD AUTO: 107 THOU/UL (ref 130–400)
POLYCHROMASIA BLD QL SMEAR: (no result) (100X)
POTASSIUM SERPL-SCNC: 3.9 MMOL/L (ref 3.5–5.1)
RBC # BLD AUTO: 3.95 MILL/UL (ref 4.7–6.1)
SODIUM SERPL-SCNC: 136 MMOL/L (ref 136–145)
WBC # BLD AUTO: 5.6 THOU/UL (ref 4.8–10.8)

## 2019-11-16 RX ADMIN — INSULIN LISPRO PRN UNIT: 100 INJECTION, SOLUTION INTRAVENOUS; SUBCUTANEOUS at 11:50

## 2019-11-16 RX ADMIN — ASPIRIN SCH MG: 81 TABLET ORAL at 09:44

## 2019-11-16 NOTE — PDOC.FM
- Subjective


Subjective: 





Pt's breathing is improving. He denies chest pain this morning. He states the 

swelling in his hands is better. He has no complaints





- Objective


MAR Reviewed: Yes


Vital Signs & Weight: 


 Vital Signs (12 hours)











  Temp Pulse Resp BP Pulse Ox


 


 11/16/19 04:00  98.2 F  93  23 H  126/61  92 L


 


 11/15/19 19:47  99.2 F  98  20  133/72  98








 Weight











Admit Weight                   124.194 kg


 


Weight                         120.021 kg














I&O: 


 











 11/14/19 11/15/19 11/16/19





 06:59 06:59 06:59


 


Intake Total  990 960


 


Output Total  3800 2550


 


Balance  -2810 -1590











Result Diagrams: 


 11/16/19 04:33





 11/16/19 04:33





Phys Exam





- Physical Examination


Constitutional: NAD


HEENT: moist MMs


Neck: no JVD


Respiratory: no wheezing


Crackles in bases bilaterally, improved air movement


Cardiovascular: RRR


2/6 systolic murmur


Gastrointestinal: soft, non-tender, no distention, positive bowel sounds


Musculoskeletal: pulses present, edema present (1+ pitting to mid shin)


Neurological: moves all 4 limbs


Psychiatric: A&O x 3


Skin: cap refill <2 seconds





Dx/Plan


(1) PORFIRIO (acute kidney injury)


Code(s): N17.9 - ACUTE KIDNEY FAILURE, UNSPECIFIED   Status: Acute   





(2) HIV (human immunodeficiency virus infection)


Status: Chronic   





(3) HTN (hypertension)


Code(s): I10 - ESSENTIAL (PRIMARY) HYPERTENSION   Status: Acute   





(4) T2DM (type 2 diabetes mellitus)


Status: Chronic   





- Plan


Plan: 








This is a 70 yo male with a pmh of Chronic anemia, IDDM2, HTN, CAD, HLD, 

Cirrhosis 2/2 fatty liver, HIV








Acute hypoxic respiratory failure likely 2/2 new HFpEF exacerbation


-S/P lasix 80mg in outside hospital


-


-Undergoing diuresis with lasix. Strict I&Os and daily weights, 1.5 L down 

overnight, 4.4 L down for hospital stay


-Dr. Sam consulted, will appreciate  recommendations


-Pt will likely need beta blocker and ACEi before discharge


-Echo shows EF of 50-55%, LVH, and diastolic dysfunction





NSTEMI type 1


-May be contributing to the new CHF


-S/P therapeutic lovenox and aspirin


-ST depressions in lateral leads


-Plan for cardiology consult in the AM


-Troponins trending up:0.502, 1.276, 1.845


-Will continue monitoring for pain and repeat EKG with any changes


-Dr. Sam recommends medical management and treatment of underlying CHF 

exacerbation





PORFIRIO on CKD 2 likely 2/2 new CHF exacerbation


-Diuresis and follow with BMPs


-Improving while on lasix





Symptomatic anemia


-Transfusion threshold is <8.0 due to cardiac condition


-S/P 1u PRBCs, will reassess after


-Will monitor respirations closely for fluid overload, pt given 40mg lasix 

before transfusion


-Hgb 7.9 this AM, pt will likely Hemoconcentrate with diuresis. Will follow and 

transfuse if this drops more or pt becomes symptomatic





Chronic anemia


-Pt is undergoing workup with Dr. Adair. No source at this point


-MCV would suggest iron deficiency, however RDW is elevated suggesting 

multifocal anemia or large reticulocyte population





IDDM2


-Continue home 





HTN


-Monitor BP, no meds on home list





HIV


-Pt reports undetectable viral load, pending viral load and CD4 count


-Continue home raltegravir and ritonavir





CAD


-Continue aspirin, starting atorvastatin





Cirrhosis 2/2 fatty liver


-Check on hep A and B vaccine

## 2019-11-17 VITALS — DIASTOLIC BLOOD PRESSURE: 78 MMHG | SYSTOLIC BLOOD PRESSURE: 138 MMHG

## 2019-11-17 VITALS — TEMPERATURE: 97.8 F

## 2019-11-17 LAB
ALBUMIN SERPL BCG-MCNC: 3.9 G/DL (ref 3.4–4.8)
ALP SERPL-CCNC: 115 U/L (ref 40–110)
ALT SERPL W P-5'-P-CCNC: 30 U/L (ref 8–55)
ANION GAP SERPL CALC-SCNC: 10 MMOL/L (ref 10–20)
AST SERPL-CCNC: 29 U/L (ref 5–34)
BILIRUB SERPL-MCNC: 0.5 MG/DL (ref 0.2–1.2)
BUN SERPL-MCNC: 22 MG/DL (ref 8.4–25.7)
CALCIUM SERPL-MCNC: 9.1 MG/DL (ref 7.8–10.44)
CHLORIDE SERPL-SCNC: 104 MMOL/L (ref 98–107)
CO2 SERPL-SCNC: 27 MMOL/L (ref 23–31)
CREAT CL PREDICTED SERPL C-G-VRATE: 104 ML/MIN (ref 70–130)
GLOBULIN SER CALC-MCNC: 3.4 G/DL (ref 2.4–3.5)
GLUCOSE SERPL-MCNC: 151 MG/DL (ref 80–115)
HGB BLD-MCNC: 8.2 G/DL (ref 14–18)
HIV1 RNA # SERPL NAA+PROBE: <20 COPIES/ML
HIV1 RNA SERPL NAA+PROBE-LOG#: (no result) {LOG_COPIES}/ML
MCH RBC QN AUTO: 19.9 PG (ref 27–31)
MCV RBC AUTO: 65.8 FL (ref 78–98)
MDIFF COMPLETE?: YES
MICROCYTES BLD QL SMEAR: (no result) (100X)
PLATELET # BLD AUTO: 103 THOU/UL (ref 130–400)
POTASSIUM SERPL-SCNC: 3.8 MMOL/L (ref 3.5–5.1)
RBC # BLD AUTO: 4.13 MILL/UL (ref 4.7–6.1)
SODIUM SERPL-SCNC: 137 MMOL/L (ref 136–145)
WBC # BLD AUTO: 6.1 THOU/UL (ref 4.8–10.8)

## 2019-11-17 RX ADMIN — ASPIRIN SCH MG: 81 TABLET ORAL at 09:14

## 2019-11-17 RX ADMIN — INSULIN LISPRO PRN UNIT: 100 INJECTION, SOLUTION INTRAVENOUS; SUBCUTANEOUS at 11:12

## 2019-11-17 NOTE — PRG
DATE OF SERVICE:  11/17/2019



The patient was seen, evaluated, discussed, and examined with the residents by

bedside.  Please see Dr. Sage's note for which I agree.  Really, after this heart

attack and heart failure exacerbation, the patient is doing a lot better.  No longer

fluid overloaded.  No chest pain or shortness of breath.  Energy level is good and

feels fine otherwise.  On exam, chest is clear.  Cardiovascular, regular rate and

rhythm.  Extremities show only trace edema and should be ready to go home today as

Cardiology cleared him.  He will continue same medicines that he is currently on and

discussed daily weights and call if his weight increases.  Follow up with Cardiology

in the next week or so.  He will notify us if there are any problems sooner. 







Job ID:  [id number]

## 2019-11-17 NOTE — PDOC.FM
- Subjective


Subjective: 





Pt states his breathing is improving. He denies chest pain. No other complaints 

this morning. 





- Objective


MAR Reviewed: Yes


Vital Signs & Weight: 


 Vital Signs (12 hours)











  Temp Pulse Resp Pulse Ox


 


 11/17/19 04:00  98.6 F  89  20  89 L








 Weight











Admit Weight                   124.194 kg


 


Weight                         120.021 kg














I&O: 


 











 11/15/19 11/16/19 11/17/19





 06:59 06:59 06:59


 


Intake Total 990 960 900


 


Output Total 3800 2550 2000


 


Balance -2810 -1590 -1100











Result Diagrams: 


 11/17/19 04:12





 11/17/19 04:12





Phys Exam





- Physical Examination


Constitutional: NAD


HEENT: moist MMs


Neck: no JVD


Respiratory: no wheezing


very mild basilar crackls


Cardiovascular: RRR, no significant murmur


Gastrointestinal: soft, non-tender, no distention, positive bowel sounds


Musculoskeletal: pulses present, edema present (trace)


Neurological: moves all 4 limbs


Psychiatric: A&O x 3


Skin: cap refill <2 seconds





Dx/Plan


(1) PORFIRIO (acute kidney injury)


Code(s): N17.9 - ACUTE KIDNEY FAILURE, UNSPECIFIED   Status: Acute   





(2) HIV (human immunodeficiency virus infection)


Status: Chronic   





(3) HTN (hypertension)


Code(s): I10 - ESSENTIAL (PRIMARY) HYPERTENSION   Status: Acute   





(4) T2DM (type 2 diabetes mellitus)


Status: Chronic   





- Plan


Plan: 





This is a 70 yo male with a pmh of Chronic anemia, IDDM2, HTN, CAD, HLD, 

Cirrhosis 2/2 fatty liver, HIV








Acute hypoxic respiratory failure likely 2/2 new HFpEF exacerbation


-


-Undergoing diuresis. Strict I&Os and daily weights, 1.5 L down overnight, 4.4 

L down for hospital stay


-Switching to PO lasix


-Dr. Sam consulted, will appreciate  recommendations


-Pt will likely need beta blocker and ACEi before discharge


-Echo shows EF of 50-55%, LVH, and diastolic dysfunction





NSTEMI type 1


-May be contributing to the new CHF


-Troponins trending up:0.502, 1.276, 1.845


-Will continue monitoring for pain and repeat EKG with any changes


-Dr. Sam recommends medical management and treatment of underlying CHF 

exacerbation





PORFIRIO on CKD 2 likely 2/2 new CHF exacerbation


-Diuresis and follow with BMPs


-Improving while on lasix, slight bump in Cr today, switching to PO lasix





Symptomatic anemia


-Transfusion threshold is <8.0 due to cardiac condition


-S/P 1u PRBCs, will reassess after


-Will monitor respirations closely for fluid overload, pt given 40mg lasix 

before transfusion


-Hgb 7.9 this AM, pt will likely Hemoconcentrate with diuresis. Will follow and 

transfuse if this drops more or pt becomes symptomatic





Chronic anemia


-Pt is undergoing workup with Dr. Adair. No source at this point


-MCV would suggest iron deficiency, however RDW is elevated suggesting 

multifocal anemia or large reticulocyte population





IDDM2


-Continue home 





HTN


-Monitor BP, no meds on home list





HIV


-Pt reports undetectable viral load, pending viral load and CD4 count


-Continue home raltegravir and ritonavir





CAD


-Continue aspirin, starting atorvastatin





Cirrhosis 2/2 fatty liver


-Check on hep A and B vaccine

## 2019-11-18 LAB
CD3+CD4+ CELLS # BLD: 235 /UL (ref 359–1519)
CD3+CD4+ CELLS NFR BLD: 18.1 % (ref 30.8–58.5)
LYMPHOCYTES # BLD AUTO: 1.3 X10E3/UL (ref 0.7–3.1)
LYMPHOCYTES NFR BLD AUTO: 18 %
NRBC BLD AUTO-RTO: (no result) %
WBC # BLD AUTO: 6.8 X10E3/UL (ref 3.4–10.8)

## 2019-12-30 ENCOUNTER — HOSPITAL ENCOUNTER (OUTPATIENT)
Dept: HOSPITAL 18 - NAV LAB | Age: 69
Discharge: HOME | End: 2019-12-30
Attending: FAMILY MEDICINE
Payer: MEDICARE

## 2019-12-30 DIAGNOSIS — I50.32: Primary | ICD-10-CM

## 2019-12-30 DIAGNOSIS — N18.2: ICD-10-CM

## 2019-12-30 LAB
ANION GAP SERPL CALC-SCNC: 17 MMOL/L (ref 10–20)
BUN SERPL-MCNC: 30 MG/DL (ref 8.4–25.7)
CALCIUM SERPL-MCNC: 9.5 MG/DL (ref 7.8–10.44)
CHLORIDE SERPL-SCNC: 101 MMOL/L (ref 98–107)
CO2 SERPL-SCNC: 24 MMOL/L (ref 23–31)
CREAT CL PREDICTED SERPL C-G-VRATE: 0 ML/MIN (ref 70–130)
GLUCOSE SERPL-MCNC: 152 MG/DL (ref 80–115)
POTASSIUM SERPL-SCNC: 4.3 MMOL/L (ref 3.5–5.1)
SODIUM SERPL-SCNC: 138 MMOL/L (ref 136–145)

## 2019-12-30 PROCEDURE — 36415 COLL VENOUS BLD VENIPUNCTURE: CPT

## 2019-12-30 PROCEDURE — 80048 BASIC METABOLIC PNL TOTAL CA: CPT

## 2020-05-18 ENCOUNTER — HOSPITAL ENCOUNTER (OUTPATIENT)
Dept: HOSPITAL 92 - SCSULT | Age: 70
Discharge: HOME | End: 2020-05-18
Attending: INTERNAL MEDICINE
Payer: MEDICARE

## 2020-05-18 DIAGNOSIS — D63.1: ICD-10-CM

## 2020-05-18 DIAGNOSIS — K72.90: ICD-10-CM

## 2020-05-18 DIAGNOSIS — K74.60: Primary | ICD-10-CM

## 2020-05-18 DIAGNOSIS — N18.9: ICD-10-CM

## 2020-05-18 DIAGNOSIS — R60.9: ICD-10-CM

## 2020-05-18 PROCEDURE — 76705 ECHO EXAM OF ABDOMEN: CPT

## 2020-05-18 NOTE — ULT
ULTRASOUND HEPATIC DOPPLER:

 

Date:  05/18/2020

 

HISTORY:  

Cirrhosis of the liver. 

 

COMPARISON:  

10/25/2019. 

 

FINDINGS:

The liver demonstrates coarse and increased echogenicity without focal mass or intrahepatic ductal di
latation. The patient is post cholecystectomy. The spleen is enlarged measuring 16.0 cm in length. Th
e pancreas and aorta are not well visualized due to overlying bowel gas. No free fluid is seen. 

 

There is normal flow and spectral waveforms in the hepatic, portal, and splenic vasculature. 

 

The common duct measures 4.0 mm in diameter. 

 

IMPRESSION: 

Stable exam. No evidence of hepatic mass. 

 

POS: [facility]

## 2020-05-21 ENCOUNTER — HOSPITAL ENCOUNTER (EMERGENCY)
Dept: HOSPITAL 18 - NAV ERS | Age: 70
Discharge: ANOTHER HOSPITAL | End: 2020-05-21
Payer: MEDICARE

## 2020-05-21 ENCOUNTER — HOSPITAL ENCOUNTER (INPATIENT)
Dept: HOSPITAL 92 - ERS | Age: 70
LOS: 5 days | Discharge: NURSING, REHAB OR LONG-TERM CARE | DRG: 563 | End: 2020-05-26
Attending: SURGERY | Admitting: SURGERY
Payer: MEDICARE

## 2020-05-21 VITALS — BODY MASS INDEX: 33.7 KG/M2

## 2020-05-21 DIAGNOSIS — E78.5: ICD-10-CM

## 2020-05-21 DIAGNOSIS — E11.9: ICD-10-CM

## 2020-05-21 DIAGNOSIS — F41.9: ICD-10-CM

## 2020-05-21 DIAGNOSIS — Z88.8: ICD-10-CM
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
## 2020-05-21 DIAGNOSIS — I10: ICD-10-CM

## 2020-05-21 DIAGNOSIS — K21.9: ICD-10-CM

## 2020-05-21 DIAGNOSIS — E87.5: ICD-10-CM

## 2020-05-21 DIAGNOSIS — R50.9: ICD-10-CM

## 2020-05-21 DIAGNOSIS — E11.65: ICD-10-CM

## 2020-05-21 DIAGNOSIS — Z21: ICD-10-CM

## 2020-05-21 DIAGNOSIS — I50.9: ICD-10-CM
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
## 2020-05-21 DIAGNOSIS — N17.9: ICD-10-CM

## 2020-05-21 DIAGNOSIS — Z87.891: ICD-10-CM

## 2020-05-21 DIAGNOSIS — E87.1: ICD-10-CM

## 2020-05-21 DIAGNOSIS — E78.00: ICD-10-CM

## 2020-05-21 DIAGNOSIS — I11.0: ICD-10-CM

## 2020-05-21 DIAGNOSIS — S82.832A: Primary | ICD-10-CM

## 2020-05-21 DIAGNOSIS — I25.10: ICD-10-CM

## 2020-05-21 DIAGNOSIS — Z79.01: ICD-10-CM

## 2020-05-21 DIAGNOSIS — Z79.82: ICD-10-CM

## 2020-05-21 DIAGNOSIS — I25.2: ICD-10-CM

## 2020-05-21 DIAGNOSIS — B20: ICD-10-CM

## 2020-05-21 DIAGNOSIS — Z79.899: ICD-10-CM

## 2020-05-21 DIAGNOSIS — Z79.4: ICD-10-CM

## 2020-05-21 DIAGNOSIS — K74.60: ICD-10-CM

## 2020-05-21 DIAGNOSIS — E11.42: ICD-10-CM

## 2020-05-21 DIAGNOSIS — K75.81: ICD-10-CM

## 2020-05-21 DIAGNOSIS — Z20.828: ICD-10-CM

## 2020-05-21 DIAGNOSIS — G62.9: ICD-10-CM

## 2020-05-21 DIAGNOSIS — R18.8: ICD-10-CM

## 2020-05-21 DIAGNOSIS — Z90.49: ICD-10-CM

## 2020-05-21 LAB
ALBUMIN SERPL BCG-MCNC: 4.2 G/DL (ref 3.4–4.8)
ALP SERPL-CCNC: 120 U/L (ref 40–110)
ALT SERPL W P-5'-P-CCNC: 57 U/L (ref 8–55)
ANION GAP SERPL CALC-SCNC: 16 MMOL/L (ref 10–20)
AST SERPL-CCNC: 50 U/L (ref 5–34)
BILIRUB SERPL-MCNC: 0.4 MG/DL (ref 0.2–1.2)
BUN SERPL-MCNC: 32 MG/DL (ref 8.4–25.7)
CALCIUM SERPL-MCNC: 9.5 MG/DL (ref 7.8–10.44)
CHLORIDE SERPL-SCNC: 103 MMOL/L (ref 98–107)
CO2 SERPL-SCNC: 19 MMOL/L (ref 23–31)
CREAT CL PREDICTED SERPL C-G-VRATE: 0 ML/MIN (ref 70–130)
GLOBULIN SER CALC-MCNC: 3.5 G/DL (ref 2.4–3.5)
GLUCOSE SERPL-MCNC: 168 MG/DL (ref 80–115)
HGB BLD-MCNC: 10.2 G/DL (ref 14–18)
MCH RBC QN AUTO: 26 PG (ref 27–31)
MCV RBC AUTO: 87.4 FL (ref 78–98)
MDIFF COMPLETE?: YES
PLATELET # BLD AUTO: 162 THOU/UL (ref 130–400)
POTASSIUM SERPL-SCNC: 5.5 MMOL/L (ref 3.5–5.1)
RBC # BLD AUTO: 3.9 MILL/UL (ref 4.7–6.1)
SODIUM SERPL-SCNC: 132 MMOL/L (ref 136–145)
WBC # BLD AUTO: 11.7 THOU/UL (ref 4.8–10.8)

## 2020-05-21 PROCEDURE — 85025 COMPLETE CBC W/AUTO DIFF WBC: CPT

## 2020-05-21 PROCEDURE — 36415 COLL VENOUS BLD VENIPUNCTURE: CPT

## 2020-05-21 PROCEDURE — 81003 URINALYSIS AUTO W/O SCOPE: CPT

## 2020-05-21 PROCEDURE — 36416 COLLJ CAPILLARY BLOOD SPEC: CPT

## 2020-05-21 PROCEDURE — 99284 EMERGENCY DEPT VISIT MOD MDM: CPT

## 2020-05-21 PROCEDURE — U0003 INFECTIOUS AGENT DETECTION BY NUCLEIC ACID (DNA OR RNA); SEVERE ACUTE RESPIRATORY SYNDROME CORONAVIRUS 2 (SARS-COV-2) (CORONAVIRUS DISEASE [COVID-19]), AMPLIFIED PROBE TECHNIQUE, MAKING USE OF HIGH THROUGHPUT TECHNOLOGIES AS DESCRIBED BY CMS-2020-01-R: HCPCS

## 2020-05-21 PROCEDURE — 87804 INFLUENZA ASSAY W/OPTIC: CPT

## 2020-05-21 PROCEDURE — 80048 BASIC METABOLIC PNL TOTAL CA: CPT

## 2020-05-21 PROCEDURE — 80053 COMPREHEN METABOLIC PANEL: CPT

## 2020-05-21 PROCEDURE — 82947 ASSAY GLUCOSE BLOOD QUANT: CPT

## 2020-05-21 PROCEDURE — 84100 ASSAY OF PHOSPHORUS: CPT

## 2020-05-21 PROCEDURE — G0390 TRAUMA RESPONS W/HOSP CRITI: HCPCS

## 2020-05-21 PROCEDURE — 76705 ECHO EXAM OF ABDOMEN: CPT

## 2020-05-21 PROCEDURE — 73610 X-RAY EXAM OF ANKLE: CPT

## 2020-05-21 PROCEDURE — 87635 SARS-COV-2 COVID-19 AMP PRB: CPT

## 2020-05-21 PROCEDURE — 87040 BLOOD CULTURE FOR BACTERIA: CPT

## 2020-05-21 PROCEDURE — 83735 ASSAY OF MAGNESIUM: CPT

## 2020-05-21 PROCEDURE — 71045 X-RAY EXAM CHEST 1 VIEW: CPT

## 2020-05-21 PROCEDURE — 83605 ASSAY OF LACTIC ACID: CPT

## 2020-05-21 NOTE — RAD
Exam:3 views left ankle



HISTORY: Fall. Pain. Injury.



COMPARISON: None



FINDINGS: Mildly displaced distal fibular fracture. Slight widening of the medial joint space. Correl
ate for possible medial ligamentous injury. Associated soft tissue swelling.



IMPRESSION: Fracture and possible ligament injury.



Reported By: Sarbjit Heller 

Electronically Signed:  5/21/2020 6:00 PM

## 2020-05-21 NOTE — RAD
AP CHEST:

5/21/20

 

HISTORY: 

Fever. 

 

COMPARISON: 

11/14/19.

 

Soft tissue attenuation limits the exam. The lungs appear clear with no evidence of infiltrate. The h
eart and mediastinum unremarkable. 

 

IMPRESSION:

No acute process identified. 

 

POS: [facility]

## 2020-05-21 NOTE — HP
TRAUMA SURGEON:  Jossy Solares MD



CONSULTING PHYSICIAN:  Dr. Haezl.



HISTORY OF PRESENT ILLNESS:  The patient is a 70-year-old male, presented to the

emergency department in [place] after a mechanical fall at home.  The patient has

severe peripheral neuropathy and cannot feel his extremities very well.  He reports

this is usual for him.  He denies significantly worse numbness or tingling in his

upper and lower extremities.  In [place], they completed x-rays and demonstrated

that he had a left distal fibular fracture with joint space widening.  He was

transferred here for surgery tomorrow with Dr. Hazel.  There was concern for

fever at the outside hospital of 100.5.  He received a COVID swab there and has been

afebrile ever since.  He has received Tylenol for pain control.  He has no symptoms

of COVID infection.  He denies chest pain, shortness of breath, cough, nausea,

vomiting, diarrhea, abdominal pain, fevers. 



REVIEW OF SYSTEMS:  All additional 10-point review of systems negative except as

indicated above. 



PAST MEDICAL HISTORY:  Diabetes, peripheral neuropathy, HIV, HARRIS, CAD,

hypertension, hyperlipidemia, CHF, MI.  Last echo was done on 11/15/2019,

demonstrated an EF of 50% to 55%. 



PAST SURGICAL HISTORY:  He has had a prostate surgery in the past.



SOCIAL HISTORY:  The patient denies tobacco, drug, or alcohol use.  He lives at home

alone.  His sister helps take care of him, but she is out of town at this time. 



MEDICATIONS:  The patient is not sure of his medications.  He does take Lasix.  He

says his sister helps with that.  He goes to [facility] on [address] [facility].  We will

contact his pharmacy and update his med rec.  He denies anticoagulation use. 



ALLERGIES:  NITROGLYCERIN.



PHYSICAL EXAMINATION:

VITAL SIGNS:  Temperature 99.7, pulse 92, respirations 15, oxygen saturation 98% on

room air, blood pressure 139/78. 



PRIMARY SURVEY: 

Airway intact. 

Adequate breath sounds bilaterally. 

2+ pulses in bilateral radials, femorals, and DPs. 

GCS 15.  Gross motor and sensation, intact. 

No laceration, bruising, or external bleeding. 



SECONDARY SURVEY: 

HEAD:  Normocephalic and atraumatic.  No gross palpable skull deformities or

tenderness. 

C-SPINE:  No step-offs or deformities, nontender.  C-collar not in place. 

CHEST:  Nontender.  No crepitus.  No abrasions or ecchymosis.  Equal chest movement. 

ABDOMEN:  Soft, nontender, nondistended. 

PELVIS:  Stable to palpation. 

RECTAL:  Deferred. 

GENITOURINARY:  Deferred. 

EXTREMITIES:  The patient has a splint to his left lower extremity that is clean,

dry, and in place.  Otherwise, no other extremity deformity noted.  No abrasions or

ecchymosis noted.  2+ pulses in bilateral radials, femorals, and DPs. 

BACK/SPINE:  No step-offs or deformities.  No tenderness to palpation of the

thoracic or lumbar spine.  No abrasions or ecchymosis noted. 

NEUROLOGIC:  5/5 strength in the bilateral , plantar flexion, dorsiflexion.

Gross normal sensation x4 extremities. 



LABORATORY FINDINGS:  White count 11.7, hemoglobin 12.2, hematocrit 34.1, platelets

162.  INR 1.1.  Sodium 132, potassium 5.5, chloride 103, bicarb 19, BUN 32,

creatinine 1.48, glucose 168.  Total bilirubin 0.4, AST 50, ALT 57, alkaline

phosphatase 120.  UA is negative for an infection. 



DIAGNOSTIC FINDINGS:  Chest x-ray demonstrates no acute process identified.  X-ray

of the left ankle demonstrates fracture and possible ligamentous injury of the left

fibula. 



ASSESSMENT:  

1. Status post mechanical fall from standing.

2. Left distal fibular fracture with joint space widening.

3. Hyponatremia.

4. Hyperkalemia.

5. Acute kidney injury.



PLAN:  The patient will be admitted to the Trauma Service.  He can have a diabetic

diet now and then n.p.o. at midnight.  He is to receive 1 L of normal saline for

some dehydration and hyperkalemia.  We will give it slowly at 70 an hour for one

bag.  Repeat blood work in the morning.  We will ask Nursing to complete a med rec

and we will restart home medications as clinically indicated.  COVID test was

collected today in [place], we will follow up those results.  The patient will

likely need placement in acute rehab facility postoperatively.  This patient was

discussed with Dr. Solares before this dictation. 







Job ID:  [id number]

## 2020-05-22 LAB
ANION GAP SERPL CALC-SCNC: 13 MMOL/L (ref 10–20)
BASOPHILS # BLD AUTO: 0 THOU/UL (ref 0–0.2)
BASOPHILS NFR BLD AUTO: 0.4 % (ref 0–1)
BUN SERPL-MCNC: 26 MG/DL (ref 8.4–25.7)
CALCIUM SERPL-MCNC: 8.8 MG/DL (ref 7.8–10.44)
CHLORIDE SERPL-SCNC: 102 MMOL/L (ref 98–107)
CO2 SERPL-SCNC: 23 MMOL/L (ref 23–31)
CREAT CL PREDICTED SERPL C-G-VRATE: 94 ML/MIN (ref 70–130)
EOSINOPHIL # BLD AUTO: 0.1 THOU/UL (ref 0–0.7)
EOSINOPHIL NFR BLD AUTO: 1.5 % (ref 0–10)
GLUCOSE SERPL-MCNC: 127 MG/DL (ref 80–115)
HGB BLD-MCNC: 10.2 G/DL (ref 14–18)
LYMPHOCYTES # BLD: 1.7 THOU/UL (ref 1.2–3.4)
LYMPHOCYTES NFR BLD AUTO: 18.3 % (ref 21–51)
MAGNESIUM SERPL-MCNC: 2 MG/DL (ref 1.6–2.6)
MCH RBC QN AUTO: 26.8 PG (ref 27–31)
MCV RBC AUTO: 86.7 FL (ref 78–98)
MONOCYTES # BLD AUTO: 0.9 THOU/UL (ref 0.11–0.59)
MONOCYTES NFR BLD AUTO: 10.1 % (ref 0–10)
NEUTROPHILS # BLD AUTO: 6.5 THOU/UL (ref 1.4–6.5)
NEUTROPHILS NFR BLD AUTO: 69.7 % (ref 42–75)
PLATELET # BLD AUTO: 147 THOU/UL (ref 130–400)
POTASSIUM SERPL-SCNC: 4.5 MMOL/L (ref 3.5–5.1)
RBC # BLD AUTO: 3.8 MILL/UL (ref 4.7–6.1)
SODIUM SERPL-SCNC: 133 MMOL/L (ref 136–145)
WBC # BLD AUTO: 9.3 THOU/UL (ref 4.8–10.8)

## 2020-05-22 RX ADMIN — INSULIN HUMAN PRN UNIT: 100 INJECTION, SOLUTION PARENTERAL at 21:27

## 2020-05-22 RX ADMIN — DOCUSATE SODIUM 50 MG AND SENNOSIDES 8.6 MG SCH TAB: 8.6; 5 TABLET, FILM COATED ORAL at 21:18

## 2020-05-22 RX ADMIN — DOCUSATE SODIUM 50 MG AND SENNOSIDES 8.6 MG SCH TAB: 8.6; 5 TABLET, FILM COATED ORAL at 07:37

## 2020-05-22 NOTE — PRG
DATE OF SERVICE:  05/22/2020



SUBJECTIVE:  This is a 70-year-old gentleman, who had a mechanical fall at home.

The patient was transferred from [facility] for further care.  The patient had a

temperature of 100.9, and with his history of human immunodeficiency virus, the

patient was tested for COVID-19.  Results were negative for COVID-19.  The patient

sustained a left distal fibular fracture with joint space widening.  The patient is

currently awake, alert, in no distress.  The patient had no overnight events.  The

patient's pain is controlled at this time.  The patient has been n.p.o. since

midnight. 



OBJECTIVE:  VITAL SIGNS:  Temperature 96.7, pulse 90, respirations 18, SpO2 of 95%

on room air, and blood pressure 130/70. 

GENERAL:  Well-appearing elderly gentleman, awake, alert, no distress. 

HEENT:  Atraumatic and normocephalic. 

RESPIRATORY:  Equal chest rise and fall, bilateral breath sounds clear. 

CARDIAC:  Regular rate, regular rhythm. 

EXTREMITIES:  Moves all extremities, neurovascularly intact x4, left lower extremity

in a splint. 

NEUROLOGIC:  No focal deficits.



LABORATORY DATA:  WBC 9.3, RBC 3.80, hemoglobin 10.2, hematocrit 33.3, and platelets

147.  Sodium 133, potassium 4.5, chloride 102, carbon dioxide 23, BUN 26, creatinine

1.18, estimated GFR 61, glucose 127, calcium 8.8, phosphorus 3.0, and magnesium 2.0. 



IMPRESSION:  

1. Status post mechanical fall from standing.

2. Left distal fibular fracture with joint space widening.

3. Hyponatremia, likely chronic due to medications.

4. Acute kidney injury, resolved.



PLAN:  Continue n.p.o. status.  Dr. Mayen plans to take the patient to the OR

later today.  Move the patient to the surgical floor now that his COVID-19 results

are 

negative.  Gentle maintenance fluids at 70 mL an hour as he is n.p.o.  1 L free

water restriction for his hyponatremia and history of CHF.  Continue pain regimen.

PT and OT postop.  A post-acute screen has been placed as the patient will likely

need physical therapy.  The plan was discussed with the patient and Dr. Payne, who

agrees. 







Job ID:  [id number]

## 2020-05-23 LAB
ANION GAP SERPL CALC-SCNC: 12 MMOL/L (ref 10–20)
BUN SERPL-MCNC: 27 MG/DL (ref 8.4–25.7)
CALCIUM SERPL-MCNC: 9.2 MG/DL (ref 7.8–10.44)
CHLORIDE SERPL-SCNC: 100 MMOL/L (ref 98–107)
CO2 SERPL-SCNC: 23 MMOL/L (ref 23–31)
CREAT CL PREDICTED SERPL C-G-VRATE: 87 ML/MIN (ref 70–130)
GLUCOSE SERPL-MCNC: 141 MG/DL (ref 80–115)
MAGNESIUM SERPL-MCNC: 2.4 MG/DL (ref 1.6–2.6)
POTASSIUM SERPL-SCNC: 5.2 MMOL/L (ref 3.5–5.1)
SODIUM SERPL-SCNC: 130 MMOL/L (ref 136–145)

## 2020-05-23 RX ADMIN — DOCUSATE SODIUM 50 MG AND SENNOSIDES 8.6 MG SCH TAB: 8.6; 5 TABLET, FILM COATED ORAL at 09:17

## 2020-05-23 RX ADMIN — INSULIN HUMAN PRN UNIT: 100 INJECTION, SOLUTION PARENTERAL at 16:02

## 2020-05-23 RX ADMIN — INSULIN HUMAN PRN UNIT: 100 INJECTION, SOLUTION PARENTERAL at 21:58

## 2020-05-23 RX ADMIN — DOCUSATE SODIUM 50 MG AND SENNOSIDES 8.6 MG SCH TAB: 8.6; 5 TABLET, FILM COATED ORAL at 21:55

## 2020-05-23 RX ADMIN — INSULIN HUMAN PRN UNIT: 100 INJECTION, SOLUTION PARENTERAL at 05:44

## 2020-05-23 RX ADMIN — INSULIN HUMAN PRN UNIT: 100 INJECTION, SOLUTION PARENTERAL at 12:13

## 2020-05-23 NOTE — PRG
DATE OF SERVICE:  05/22/2020



SUBJECTIVE:  The patient was seen this evening during rounds.  He was sitting 
up at

the edge of the bed, taking medications.  Nursing reported no acute events. 



OBJECTIVE:  VITAL SIGNS:  Temperature 98.2, pulse 85, respirations 20, oxygen

saturation 92% on room air, and blood pressure 121/68. 

GENERAL:  Well-appearing elderly male, sitting up at the edge of the bed with no

signs of acute distress. 

PULMONARY:  Equal chest rise and fall.  No signs of acute respiratory distress.



ASSESSMENT:  

1. Status post mechanical fall from standing.

2. Left distal fibular fracture with joint space widening.

3. Acute hyponatremia, improving.

4. Acute kidney injury, resolved.

5. History of diabetes, human immunodeficiency virus, nonalcoholic 
steatohepatitis,

peripheral neuropathy, coronary artery disease, hypertension, hyperlipidemia,

myocardial infarction, and congestive heart failure. 



PLAN:  Continue current diet and pain regimen.  Continue physical and 
occupational

therapy.  Restart home Lasix and spironolactone tomorrow.  He is pending 
placement. 







Job ID:  [id number]



[facility]

## 2020-05-23 NOTE — PRG
DATE OF SERVICE:  05/23/2020



This is Rabia Chan NP dictating a report for Valdemar Payne DO.



SUBJECTIVE:  The patient was seen during morning rounds.  Awake and alert, in no

distress.  The patient had no overnight events.  The patient's pain is controlled at

this time.  The patient was evaluated by Orthopedic Surgery and recommends

nonoperative management of his left distal fibula fracture.  The patient is

currently in a splint in his left lower extremity.  The patient has not worked with

Physical Therapy yet today. 



OBJECTIVE:  VITAL SIGNS:  Temperature 97.9, pulse 84, respirations 16, SpO2 of 94%

on room air, blood pressure 129/74. 

GENERAL:  A well-appearing elderly male, sitting up in hospital bed, in no acute

distress. 

PULMONARY:  Equal chest rise and fall, respirations are even and nonlabored. 

EXTREMITIES:  Moves all extremities, neurovascularly intact x4 and splint clean,

dry, and intact to left lower extremity. 



LABORATORY DATA:  Sodium 130, potassium 5.2, BUN 27, creatinine 1.28, estimated GFR

56, glucose 141, and calcium 9.2.  Phosphorus 3.8.  Magnesium 2.4. 



IMPRESSION:  

1. Status post mechanical fall from standing.

2. Left distal fibular fracture with joint space widening, nonoperative management.

3. Acute hyponatremia.

4. History of diabetes.

5. Human immuno deficiency virus.

6. Nonalcoholic fatty liver.

7. Peripheral neuropathy.

8. Coronary artery disease.

9. Hypertension.

10. Hyperlipidemia.

11. Myocardial infarction.

12. Congestive heart failure.



PLAN:  Continue current diet and pain regimen.  Continue physical and occupational

therapy.  Splint to the left lower extremity and nonweightbearing.  Continue pain

regimen.  Continue free water restriction for hyponatremia and congestive heart

failure.  The patient is pending placement to rehab as he will need physical and

occupational therapy.  The plan was discussed with the patient who agrees.  The

patient was examined by Dr. Payne during morning rounds. 







Job ID:  [id number]

## 2020-05-23 NOTE — CON
DATE OF CONSULTATION:  05/22/2020



HISTORY OF PRESENT ILLNESS:  Mr. Morrow is a 70-year-old male who lives at home

alone.  He fell yesterday and had immediate deformity in the left ankle.  He was

initially seen in [facility].  X-ray showed a lateral malleolar

fracture.  The patient was splinted and transferred over here.  The patient has

minimal pain because of peripheral neuropathy.  The patient was tested and was COVID

negative. 



PAST MEDICAL HISTORY:  Medical illnesses; diabetes, peripheral neuropathy, HIV,

HARRIS, CAD, hypertension, hyperlipidemia, CHF, MI. 



PAST SURGICAL HISTORY:  Prostate surgery.



SOCIAL HISTORY:  The patient lives at home alone.  He states he has a sister that

helps take care of him.  He denies tobacco, drug, or alcohol use. 



ALLERGIES:  NITROGLYCERIN.



CURRENT MEDICATIONS:  The patient is not sure of the medication that he takes.



PHYSICAL EXAMINATION:

The patient has a well-padded splint on the left lower extremity.  He has decreased

sensation in his toes, have good capillary refill. 



DIAGNOSTIC DATA:  X-rays of the left ankle shows an oblique lateral malleolar

fracture.  There is no significant subluxation in the AP or lateral planes. 



IMPRESSION:  

1. Lateral malleolar fracture of the left ankle.

2. Diabetes mellitus.

3. Peripheral neuropathy.

4. Human immunodeficiency virus.

5. Coronary artery disease.

6. Hypertension.

7. Hyperlipidemia.

8. Congestive heart failure.

9. Previous myocardial infarction.



PLAN:  At this point, I did not recommend surgery for the patient.  He will be left

in the splint.  Plan on close followup.  He was instructed that it is possible that

the ankle could sublux in which case he may end up requiring surgery.  I would like

to re-x-ray him in my office next week.  The patient needs to be nonweightbearing

because he lives alone at home and will need to be nonweightbearing.  He will

probably need to go to rehab or skilled nursing facility for a while until he can

master a walker and be nonweightbearing. 







Job ID:  [id number]

## 2020-05-24 LAB
ANION GAP SERPL CALC-SCNC: 13 MMOL/L (ref 10–20)
BUN SERPL-MCNC: 31 MG/DL (ref 8.4–25.7)
CALCIUM SERPL-MCNC: 8.9 MG/DL (ref 7.8–10.44)
CHLORIDE SERPL-SCNC: 101 MMOL/L (ref 98–107)
CO2 SERPL-SCNC: 23 MMOL/L (ref 23–31)
CREAT CL PREDICTED SERPL C-G-VRATE: 79 ML/MIN (ref 70–130)
GLUCOSE SERPL-MCNC: 161 MG/DL (ref 80–115)
MAGNESIUM SERPL-MCNC: 2.3 MG/DL (ref 1.6–2.6)
POTASSIUM SERPL-SCNC: 4.5 MMOL/L (ref 3.5–5.1)
SODIUM SERPL-SCNC: 132 MMOL/L (ref 136–145)

## 2020-05-24 RX ADMIN — INSULIN HUMAN PRN UNIT: 100 INJECTION, SOLUTION PARENTERAL at 12:12

## 2020-05-24 RX ADMIN — INSULIN HUMAN PRN UNIT: 100 INJECTION, SOLUTION PARENTERAL at 06:33

## 2020-05-24 RX ADMIN — INSULIN HUMAN PRN UNIT: 100 INJECTION, SOLUTION PARENTERAL at 17:33

## 2020-05-24 RX ADMIN — DOCUSATE SODIUM 50 MG AND SENNOSIDES 8.6 MG SCH TAB: 8.6; 5 TABLET, FILM COATED ORAL at 08:22

## 2020-05-24 RX ADMIN — DOCUSATE SODIUM 50 MG AND SENNOSIDES 8.6 MG SCH TAB: 8.6; 5 TABLET, FILM COATED ORAL at 20:42

## 2020-05-24 RX ADMIN — ASPIRIN SCH MG: 81 TABLET ORAL at 08:23

## 2020-05-24 NOTE — PRG
DATE OF SERVICE:  05/23/2020



SUBJECTIVE:  The patient was seen this evening during rounds.  He was resting

comfortably in bed and asleep with no signs of acute distress.  Nursing reported no

acute events. 



OBJECTIVE:  VITAL SIGNS:  Temperature 98.1, pulse 81, respirations 16, oxygen

saturation 91% on room air, and blood pressure 128/77. 



ASSESSMENT:  

1. Status post mechanical fall at home.

2. Left distal fibular fracture with joint space narrowing.

3. Hyponatremia, worse.

4. Acute kidney injury, resolved.

5. History of diabetes, human immunodeficiency virus, nonalcoholic steatohepatitis,

peripheral neuropathy, coronary artery disease, hypertension, hyperlipidemia,

myocardial infarction, and congestive heart failure. 



PLAN:  Continue current diet and pain regimen.  Continue free water restriction to 1

liter a day.  The patient is pending placement at rehab facility. 







Job ID:  [id number]

## 2020-05-24 NOTE — PRG
DATE OF SERVICE:  05/24/2020



SUBJECTIVE:  The patient remains on the surgical floor, awake and alert, in no

distress.  The patient had no overnight events.  The patient reports that he slept

very well last night.  The patient did have a bowel movement earlier today.  The

patient's pain has been well controlled.  The patient voices no complaints or

concerns. 



OBJECTIVE:  VITAL SIGNS:  Temperature 97.9, pulse 79, respirations 16, SpO2 of 94%

on room air, and blood pressure 137/80. 

GENERAL:  Well-appearing elderly male, awake and alert, in no distress. 

RESPIRATORY:  Equal chest rise and fall.  Respirations are even and nonlabored. 

ABDOMEN:  Abdomen is soft, obese, and nontender. 

EXTREMITIES:  Moves all extremities.  Left lower extremity in a splint.

Neurovascularly intact x4. 



ASSESSMENT:  

1. Status post mechanical fall at home.

2. Left distal fibular fracture with joint space narrowing, nonoperative management.

3. Hyponatremia.

4. Acute kidney injury, resolved.

5. History of diabetes, human immunodeficiency virus, nonalcoholic steatohepatitis,

peripheral __________, coronary artery disease, hypertension, hyperlipidemia,

myocardial infarction, and congestive heart failure. 



PLAN:  Continue current diet and pain regimen.  Continue free water restriction to 1

liter per day.  Increase physical and occupational therapy.  The patient is pending

placement at inpatient rehab.  The plan was discussed with the patient, who agrees.

The patient was examined by Dr. Payne during morning rounds. 







Job ID:  [id number]

## 2020-05-25 LAB
ANION GAP SERPL CALC-SCNC: 14 MMOL/L (ref 10–20)
BASOPHILS # BLD AUTO: 0 THOU/UL (ref 0–0.2)
BASOPHILS NFR BLD AUTO: 0.6 % (ref 0–1)
BUN SERPL-MCNC: 33 MG/DL (ref 8.4–25.7)
CALCIUM SERPL-MCNC: 9.2 MG/DL (ref 7.8–10.44)
CHLORIDE SERPL-SCNC: 99 MMOL/L (ref 98–107)
CO2 SERPL-SCNC: 23 MMOL/L (ref 23–31)
CREAT CL PREDICTED SERPL C-G-VRATE: 73 ML/MIN (ref 70–130)
EOSINOPHIL # BLD AUTO: 0.1 THOU/UL (ref 0–0.7)
EOSINOPHIL NFR BLD AUTO: 2 % (ref 0–10)
GLUCOSE SERPL-MCNC: 148 MG/DL (ref 80–115)
HGB BLD-MCNC: 10.2 G/DL (ref 14–18)
LYMPHOCYTES # BLD: 1.6 THOU/UL (ref 1.2–3.4)
LYMPHOCYTES NFR BLD AUTO: 21.5 % (ref 21–51)
MAGNESIUM SERPL-MCNC: 2.3 MG/DL (ref 1.6–2.6)
MCH RBC QN AUTO: 27.6 PG (ref 27–31)
MCV RBC AUTO: 85.5 FL (ref 78–98)
MONOCYTES # BLD AUTO: 0.6 THOU/UL (ref 0.11–0.59)
MONOCYTES NFR BLD AUTO: 7.7 % (ref 0–10)
NEUTROPHILS # BLD AUTO: 5 THOU/UL (ref 1.4–6.5)
NEUTROPHILS NFR BLD AUTO: 68.3 % (ref 42–75)
PLATELET # BLD AUTO: 154 THOU/UL (ref 130–400)
POTASSIUM SERPL-SCNC: 4.5 MMOL/L (ref 3.5–5.1)
RBC # BLD AUTO: 3.69 MILL/UL (ref 4.7–6.1)
SODIUM SERPL-SCNC: 131 MMOL/L (ref 136–145)
WBC # BLD AUTO: 7.3 THOU/UL (ref 4.8–10.8)

## 2020-05-25 RX ADMIN — DOCUSATE SODIUM 50 MG AND SENNOSIDES 8.6 MG SCH TAB: 8.6; 5 TABLET, FILM COATED ORAL at 08:58

## 2020-05-25 RX ADMIN — INSULIN HUMAN PRN UNIT: 100 INJECTION, SOLUTION PARENTERAL at 20:42

## 2020-05-25 RX ADMIN — INSULIN HUMAN PRN UNIT: 100 INJECTION, SOLUTION PARENTERAL at 16:47

## 2020-05-25 RX ADMIN — ASPIRIN SCH MG: 81 TABLET ORAL at 08:58

## 2020-05-25 RX ADMIN — DOCUSATE SODIUM 50 MG AND SENNOSIDES 8.6 MG SCH TAB: 8.6; 5 TABLET, FILM COATED ORAL at 20:41

## 2020-05-25 NOTE — PRG
DATE OF SERVICE:  05/25/2020



SUBJECTIVE:  The patient is currently on the surgical floor.  He is status post

ground level fall, in which he sustained a left distal fibular fracture with joint

space widening.  He was evaluated by Orthopedics and felt this would be best treated

nonoperatively.  He is currently tolerating a diet.  His pain is controlled and he

has begun working with Physical and Occupational Therapy.  He is currently awaiting

placement likely to the swing bed in [place] that is where he lives. 



PHYSICAL EXAMINATION:

VITAL SIGNS:  Temperature is 97.5, heart rate 76, blood pressure 132/80,

respirations 20, oxygen saturation 94% on room air. 

GENERAL:  The patient is resting comfortably in bed.  He is awake.  [place] Coma

Scale is 14, -1 for eye opening as he was asleep when I first came in. 

RESPIRATIONS:  Clear bilaterally. 

HEART:  Regular rate and rhythm. 

ABDOMEN:  Soft, nontender with active bowel sounds. 

EXTREMITIES:  Neurovascularly intact x4.  Left lower extremity has a clean, dry, and

intact posterior splint in place. 



LABORATORY FINDINGS:  White blood cell count 7.3, hemoglobin 10.2, hematocrit 31.6,

platelets 154.  Sodium 131, potassium 4.5, chloride 99, CO2 of 23, BUN 32,

creatinine 1.52, glucose 148, magnesium 2.3, phosphorus 3.5.  There are no

radiographs to review this morning. 



ASSESSMENT:  

1. Status post mechanical fall.

2. Left distal fibular fracture with joint space widening, being treated

nonoperatively. 

3. Hyponatremia, stable.

4. Acute kidney injury, stable.

5. History of diabetes, HIV, nonalcoholic steatohepatitis.

6. Peripheral neuropathy.

7. Coronary artery disease.

8. History of hypertension, hyperlipidemia, myocardial infarction, and congestive

heart failure. 



PLAN:  Plan will be to continue current supportive measures.  We will continue free

water restriction.  Add sodium to his diet.  Resume all home medications and await

placement. 







Job ID:  [id number]

## 2020-05-25 NOTE — PRG
DATE OF SERVICE:  05/24/2020



SUBJECTIVE:  The patient was seen this evening during rounds.  He was lying in bed

sleeping with no signs of acute distress.  Nursing reported no acute events. 



OBJECTIVE:  VITAL SIGNS:  Temperature 98.2, pulse 79, respirations 18, oxygen

saturation 94% on room air, and blood pressure 125/78. 

GENERAL:  Well-appearing elderly male, lying in bed, asleep, but no signs of acute

distress. 



ASSESSMENT:  

1. Status post mechanical fall.

2. Left distal fibular fracture with joint space narrowing, nonoperative.

3. Hyponatremia, improving.

4. Acute kidney injury, stable.

5. History of diabetes, human immunodeficiency virus, nonalcoholic steatohepatitis,

peripheral neuropathy, coronary artery disease, hypertension, hyperlipidemia,

myocardial infarction, and congestive heart failure. 



PLAN:  Continue current diet and pain regimen.  Continue free water restriction.

Continue all home medications.  The patient is pending placement at acute rehab

facility. 







Job ID:  [id number]

## 2020-05-26 ENCOUNTER — HOSPITAL ENCOUNTER (INPATIENT)
Dept: HOSPITAL 18 - NAV ACUTE | Age: 70
LOS: 43 days | Discharge: HOME HEALTH | DRG: 563 | End: 2020-07-08
Attending: FAMILY MEDICINE | Admitting: FAMILY MEDICINE
Payer: MEDICARE

## 2020-05-26 VITALS — SYSTOLIC BLOOD PRESSURE: 170 MMHG | TEMPERATURE: 98.1 F | DIASTOLIC BLOOD PRESSURE: 93 MMHG

## 2020-05-26 VITALS — BODY MASS INDEX: 33.7 KG/M2

## 2020-05-26 DIAGNOSIS — Z21: ICD-10-CM

## 2020-05-26 DIAGNOSIS — I50.32: ICD-10-CM

## 2020-05-26 DIAGNOSIS — G47.00: ICD-10-CM

## 2020-05-26 DIAGNOSIS — F32.9: ICD-10-CM

## 2020-05-26 DIAGNOSIS — Z86.73: ICD-10-CM

## 2020-05-26 DIAGNOSIS — F41.9: ICD-10-CM

## 2020-05-26 DIAGNOSIS — S82.62XA: Primary | ICD-10-CM

## 2020-05-26 DIAGNOSIS — Z90.49: ICD-10-CM

## 2020-05-26 DIAGNOSIS — K43.9: ICD-10-CM

## 2020-05-26 DIAGNOSIS — K21.9: ICD-10-CM

## 2020-05-26 DIAGNOSIS — I11.0: ICD-10-CM

## 2020-05-26 DIAGNOSIS — K76.0: ICD-10-CM

## 2020-05-26 DIAGNOSIS — E11.42: ICD-10-CM

## 2020-05-26 DIAGNOSIS — Z79.4: ICD-10-CM

## 2020-05-26 DIAGNOSIS — D50.0: ICD-10-CM

## 2020-05-26 LAB
ANION GAP SERPL CALC-SCNC: 14 MMOL/L (ref 10–20)
BUN SERPL-MCNC: 30 MG/DL (ref 8.4–25.7)
CALCIUM SERPL-MCNC: 9.3 MG/DL (ref 7.8–10.44)
CHLORIDE SERPL-SCNC: 98 MMOL/L (ref 98–107)
CO2 SERPL-SCNC: 23 MMOL/L (ref 23–31)
CREAT CL PREDICTED SERPL C-G-VRATE: 84 ML/MIN (ref 70–130)
GLUCOSE SERPL-MCNC: 148 MG/DL (ref 80–115)
MAGNESIUM SERPL-MCNC: 2.2 MG/DL (ref 1.6–2.6)
POTASSIUM SERPL-SCNC: 4.5 MMOL/L (ref 3.5–5.1)
SODIUM SERPL-SCNC: 130 MMOL/L (ref 136–145)

## 2020-05-26 PROCEDURE — 85048 AUTOMATED LEUKOCYTE COUNT: CPT

## 2020-05-26 PROCEDURE — 87340 HEPATITIS B SURFACE AG IA: CPT

## 2020-05-26 PROCEDURE — 86480 TB TEST CELL IMMUN MEASURE: CPT

## 2020-05-26 PROCEDURE — 87491 CHLMYD TRACH DNA AMP PROBE: CPT

## 2020-05-26 PROCEDURE — 85025 COMPLETE CBC W/AUTO DIFF WBC: CPT

## 2020-05-26 PROCEDURE — 70450 CT HEAD/BRAIN W/O DYE: CPT

## 2020-05-26 PROCEDURE — 85610 PROTHROMBIN TIME: CPT

## 2020-05-26 PROCEDURE — 81001 URINALYSIS AUTO W/SCOPE: CPT

## 2020-05-26 PROCEDURE — 86708 HEPATITIS A ANTIBODY: CPT

## 2020-05-26 PROCEDURE — 86361 T CELL ABSOLUTE COUNT: CPT

## 2020-05-26 PROCEDURE — U0003 INFECTIOUS AGENT DETECTION BY NUCLEIC ACID (DNA OR RNA); SEVERE ACUTE RESPIRATORY SYNDROME CORONAVIRUS 2 (SARS-COV-2) (CORONAVIRUS DISEASE [COVID-19]), AMPLIFIED PROBE TECHNIQUE, MAKING USE OF HIGH THROUGHPUT TECHNOLOGIES AS DESCRIBED BY CMS-2020-01-R: HCPCS

## 2020-05-26 PROCEDURE — 87536 HIV-1 QUANT&REVRSE TRNSCRPJ: CPT

## 2020-05-26 PROCEDURE — 71046 X-RAY EXAM CHEST 2 VIEWS: CPT

## 2020-05-26 PROCEDURE — 36415 COLL VENOUS BLD VENIPUNCTURE: CPT

## 2020-05-26 PROCEDURE — 86803 HEPATITIS C AB TEST: CPT

## 2020-05-26 PROCEDURE — 87591 N.GONORRHOEAE DNA AMP PROB: CPT

## 2020-05-26 PROCEDURE — 86706 HEP B SURFACE ANTIBODY: CPT

## 2020-05-26 PROCEDURE — 80053 COMPREHEN METABOLIC PANEL: CPT

## 2020-05-26 PROCEDURE — 87635 SARS-COV-2 COVID-19 AMP PRB: CPT

## 2020-05-26 PROCEDURE — 86593 SYPHILIS TEST NON-TREP QUANT: CPT

## 2020-05-26 PROCEDURE — 36416 COLLJ CAPILLARY BLOOD SPEC: CPT

## 2020-05-26 RX ADMIN — INSULIN LISPRO SCH AMPULE: 100 INJECTION, SOLUTION INTRAVENOUS; SUBCUTANEOUS at 21:50

## 2020-05-26 RX ADMIN — ASPIRIN SCH MG: 81 TABLET ORAL at 08:18

## 2020-05-26 RX ADMIN — INSULIN GLARGINE SCH UNITS: 100 INJECTION, SOLUTION SUBCUTANEOUS at 21:52

## 2020-05-26 RX ADMIN — DOCUSATE SODIUM 50 MG AND SENNOSIDES 8.6 MG SCH TAB: 8.6; 5 TABLET, FILM COATED ORAL at 08:19

## 2020-05-26 RX ADMIN — INSULIN HUMAN PRN UNIT: 100 INJECTION, SOLUTION PARENTERAL at 06:20

## 2020-05-26 NOTE — PRG
DATE OF SERVICE:  05/25/2020



SUBJECTIVE:  The patient was seen this evening during rounds.  He was lying in bed,

resting comfortably, and asleep with no signs of acute distress.  Nursing reported

no acute events. 



OBJECTIVE:  VITAL SIGNS:  Temperature 98.3, pulse 80, respirations 16, oxygen

saturation 95% on room air, and blood pressure 114/76. 



ASSESSMENT:  

1. Status post mechanical fall.

2. Left distal fibular fracture with joint space widening.

3. Acute kidney injury, slightly worsened.

4. Uncontrolled glucose.

5. History of diabetes, human immunodeficiency virus, nonalcoholic steatohepatitis,

neuropathy, coronary artery disease, hypertension, hyperlipidemia, myocardial

infarction, and congestive heart failure. 



PLAN:  Continue current diet and pain regimen.  Increase to moderate insulin sliding

scale.  The patient encouraged to drink slightly more.  We will also hold his Lasix

one time tomorrow and repeat blood work in the morning to evaluate his kidney

function. 







Job ID:  [id number]

## 2020-05-27 LAB
ALBUMIN SERPL BCG-MCNC: 3.8 G/DL (ref 3.4–4.8)
ALP SERPL-CCNC: 139 U/L (ref 40–110)
ALT SERPL W P-5'-P-CCNC: 61 U/L (ref 8–55)
ANION GAP SERPL CALC-SCNC: 14 MMOL/L (ref 10–20)
AST SERPL-CCNC: 27 U/L (ref 5–34)
BACTERIA UR QL AUTO: (no result) HPF
BASOPHILS # BLD AUTO: 0.1 THOU/UL (ref 0–0.2)
BASOPHILS NFR BLD AUTO: 1.2 % (ref 0–1)
BILIRUB SERPL-MCNC: 0.4 MG/DL (ref 0.2–1.2)
BUN SERPL-MCNC: 25 MG/DL (ref 8.4–25.7)
CALCIUM SERPL-MCNC: 9.4 MG/DL (ref 7.8–10.44)
CHLORIDE SERPL-SCNC: 99 MMOL/L (ref 98–107)
CO2 SERPL-SCNC: 23 MMOL/L (ref 23–31)
CREAT CL PREDICTED SERPL C-G-VRATE: 0 ML/MIN (ref 70–130)
EOSINOPHIL # BLD AUTO: 0.2 THOU/UL (ref 0–0.7)
EOSINOPHIL NFR BLD AUTO: 2.6 % (ref 0–10)
GLOBULIN SER CALC-MCNC: 3.6 G/DL (ref 2.4–3.5)
GLUCOSE SERPL-MCNC: 132 MG/DL (ref 80–115)
HGB BLD-MCNC: 10.1 G/DL (ref 14–18)
LYMPHOCYTES # BLD AUTO: 1.6 THOU/UL (ref 1.2–3.4)
LYMPHOCYTES NFR BLD AUTO: 21 % (ref 21–51)
MCH RBC QN AUTO: 26 PG (ref 27–31)
MCV RBC AUTO: 85.2 FL (ref 78–98)
MONOCYTES # BLD AUTO: 0.6 THOU/UL (ref 0.11–0.59)
MONOCYTES NFR BLD AUTO: 7.7 % (ref 0–10)
NEUTROPHILS # BLD AUTO: 5.1 THOU/UL (ref 1.4–6.5)
NEUTROPHILS NFR BLD AUTO: 67.5 % (ref 42–75)
PLATELET # BLD AUTO: 160 THOU/UL (ref 130–400)
POTASSIUM SERPL-SCNC: 4.4 MMOL/L (ref 3.5–5.1)
RBC # BLD AUTO: 3.9 MILL/UL (ref 4.7–6.1)
SODIUM SERPL-SCNC: 132 MMOL/L (ref 136–145)
SP GR UR STRIP: 1.01 (ref 1–1.03)
WBC # BLD AUTO: 7.6 THOU/UL (ref 4.8–10.8)

## 2020-05-27 RX ADMIN — ASPIRIN SCH MG: 81 TABLET ORAL at 08:35

## 2020-05-27 RX ADMIN — INSULIN LISPRO SCH UNIT: 100 INJECTION, SOLUTION INTRAVENOUS; SUBCUTANEOUS at 17:19

## 2020-05-27 RX ADMIN — INSULIN GLARGINE SCH UNITS: 100 INJECTION, SOLUTION SUBCUTANEOUS at 21:25

## 2020-05-27 RX ADMIN — INSULIN GLARGINE SCH UNITS: 100 INJECTION, SOLUTION SUBCUTANEOUS at 08:32

## 2020-05-27 RX ADMIN — INSULIN LISPRO SCH: 100 INJECTION, SOLUTION INTRAVENOUS; SUBCUTANEOUS at 07:54

## 2020-05-27 NOTE — HP
HISTORY OF PRESENT ILLNESS:  Mr. Morrow is a well-developed, well-nourished, 
large

70-year-old diabetic white male, who unfortunately was at home and fell.  He 
had a

deformity of the left ankle and was taken to the emergency room in [place], [place].  X-ray revealed a lateral malleolar fracture.  The patient was 
placed

in a splint and transferred to [facility] in [place] where he was seen 
by Dr. Rush.  The patient has no significant pain because he has diabetic peripheral

neuropathy.  The patient was tested, noted to be COVID negative.  The patient 
was

admitted for further evaluation and treatment. 



PAST MEDICAL HISTORY:  

1. Diabetes type 2.

2. Essential hypertension.

3. Nonalcoholic fatty liver.

4. Cirrhosis of the liver, unspecified.

5. Thyroglossal duct cyst.

6. Long-term current use of insulin.

7. Gastroesophageal reflux with esophagitis.

8. HIV.

9. Diabetic polyneuropathy associated with type 2 diabetes.

10. Night terrors.

11. Insomnia.

12. Eczema.

13. Ascites.

14. Anxiety disorder.

15. Major depressive disorder.

16. Generalized weakness.

17. Encounter for long-term drug use.

18. Stroke.

19. Prostate cancer.

20. Iron deficiency anemia with chronic blood loss.

21. Diastolic heart failure.



PAST SURGICAL HISTORY:  

1. Prostatectomy.

2. Cholecystectomy in .

3. Tonsillectomy as a child.



FAMILY HISTORY:  

Reveals the patient's father  at age 69 from pancreatic cancer,

prostate cancer, and heart attack. 

The patient's mother  at age 74 with stroke, heart attack. 

The patient has one sister, who is healthy with hypertension. 

The patient has no children.



SOCIAL HISTORY:  

1. Reveals the patient lives by himself.  He is a former smoker and quit in 
 and

had a half a pack per day before then. 

2. The patient is a retired graphic design artist.

3. The patient is single.

4. The patient has rare alcohol intake at all.

5. The patient uses marijuana for recreational drugs.

6. Previously sexual, now abstinent.

7. Caffeine 1 to 2 cups a day.



ALLERGIES:  REVEALS THE PATIENT IS ALLERGIC TO NITROGLYCERIN, WHICH DROPS HIS 
BLOOD

PRESSURE. 



PRESENT MEDICATIONS:  Includes the following;

1. Tramadol 50 mg to 100 mg q.6 hours p.r.n. moderate to severe pain.

2. Meloxicam 15 mg at bedtime with food.

3. Metformin  mg b.i.d.

4. Aspirin 81 mg daily.

5. Ritonavir 100 mg daily.

6. Raltegravir 400 mg twice a day.

7. Lantus 70 units b.i.d.

8. Humalog sliding scale.

9. Gabapentin 600 mg b.i.d.

10. Darunavir ethanolate one tab daily.

11. Rifaximin 550 mg b.i.d.

12. Amitriptyline 150 mg at bedtime.

13. Amitriptyline 100 mg every morning.

14. Escitalopram 20 mg daily.

15. Lasix 40 mg each day.

16. Atorvastatin 40 mg each day.

17. Omeprazole 20 mg daily.

18. Carvedilol 12.5 mg b.i.d.

19. Vascepa two capsules b.i.d.

20. Spironolactone 50 mg every morning.



REVIEW OF SYSTEMS:  CONSTITUTIONAL:  

GENERAL:The patient denies fever, chills, or night sweats. 

HEENT:  The patient denies change in vision or hearing. 

RESPIRATORY:  The patient denies cough, cold, congestion, or hemoptysis. 

CARDIOVASCULAR:  The patient denies chest pain, PND, edema, dyspnea on exertion
, or

claudication. 

GI:  The patient denies hematochezia, bowel change, nausea, vomiting, diarrhea, 
or

constipation. 

: The patient denies urgency, frequency, dysuria, nocturia, or hematuria. 

MUSCULOSKELETAL:  The patient admits to left foot and ankle, which is swollen,

presently splinted and wrapped, but has some joint pain, stiffness, and 
weakness. 

SKIN: Denies skin rashes or skin lesions that are changing. 

ENDOCRINE: Denies heat, cold intolerance, or hair loss, changes in skin color,

polydipsia, polyphagia, polyuria, or change in thirst or appetite. 

LYMPHATIC: Denies nodules, kernels. 

NEUROLOGIC: Denies dizziness, syncope, paralysis, sensation loss, numbness, or

ataxia. 

PSYCHIATRIC: Denies mental illness, psychological history, but does admit to 
sleep

problems. 



PHYSICAL EXAMINATION:

GENERAL:  This is a well-developed, well-nourished 70-year-old white male, in no

apparent distress at this time. 

HEENT:  Reveals normocephalic and nontraumatic cranium. Pupils equal, round, and

reactive.  Extraocular movements are intact. Nose and throat are slightly dry. 

NECK:  Supple without masses, nodes, or bruits. 

CHEST:  Clear to auscultation.  No rales, rhonchi, wheezes, or cough is noted. 

HEART:  Reveals a regular rate and rhythm without murmurs, gallops, or rubs. 

ABDOMEN:  Obese, soft, nontender without organomegaly.  Normal bowel sounds are

noted in all 4 quadrants.  No palpable masses, guarding, or rigidity is noted.  
No

CVA tenderness is noted. 

GENITOURINARY:  Deferred. 

EXTREMITIES: Reveal no clubbing, cyanosis, or edema. 

NEUROLOGIC: Grossly unremarkable with no focal findings.



ASSESSMENT:  

1. This is a well-developed, well-nourished 70-year-old white male, with 
multiple

medical problems, but most important is lateral malleolar fracture, which Dr. Rush thought was non-operable at this time. 

2. Hypertension.

3. Diabetes.

4. Human immunodeficiency virus.

5. Hyperlipidemia.

6. Diabetic neuropathy.

7. Iron deficiency anemia.

8. Diastolic heart failure.



PLAN:  

1. Continue to monitor the patient's blood pressure closely and adjust 
medications

as needed. 

2. Continue to monitor the patient's diabetes with Accu-Cheks a.c. and at 
bedtime.

3. Monitor the patient's anemia.

4. Monitor the patient for signs and symptoms of congestive heart failure.

5. Consult Physical Therapy and Occupational Therapy.

6. Nonweightbearing on that fractured left ankle.

7. Follow up with Dr. Rush in approximately one month at his office.

8. Supportive care with caring.







Job ID:  [id number]



[facility]

## 2020-05-27 NOTE — PRG
DATE OF SERVICE:  05/27/2020



SUBJECTIVE:  Mr. Morrow is a pleasant 70-year-old diabetic male patient of mine,

who fell at home and had a resultant left ankle fibular fracture.  X-ray revealed a

lateral malleolar fracture.  The patient was placed in a splint and transferred to

[facility] in [place], where he was seen by Dr. Rush.  Dr. Rush felt

that the patient did not need surgical intervention and was noted to be COVID-19

negative.  The patient was transferred to [facility] for physical

therapy, occupational therapy, and nonweightbearing status. 



The patient states he did well last night and did not have a lot of pain.  He worked

hard with therapy on transfers today. 



LABORATORY DATA:  This morning revealed white count 7600, hemoglobin 10.1,

hematocrit 33.2, platelet count 160,000. 

Sodium 132, potassium 4.4, chloride 99, carbon dioxide 23 with a BUN 25, creatinine

1.20, and sugar this morning was 132 fasting.  ALT slightly elevated at 61. 



Urinalysis was unremarkable.



OBJECTIVE:  VITAL SIGNS:  Today reveal blood pressure 118/69, pulse 84, respirations

18, O2 saturation 91% to 95% on room air, T-max 98.7. 

GENERAL:  This is a well-developed, well-nourished, obese white male, in no apparent

distress at this time. 

HEENT:  Reveals normocephalic and nontraumatic cranium.  Pupils are equally round

and reactive.  Extraocular movements are intact.  Nose and throat are slightly dry. 

NECK:  Supple without masses, nodes, or bruits. 

CHEST:  Clear to auscultation.  No rales, rhonchi, or wheezes are heard. 

HEART:  Reveals a regular rate and rhythm without murmurs, gallops, or rubs. 

ABDOMEN:  Obese, soft, and nontender without organomegaly.  Normal bowel sounds

noted in all 4 quadrants.  No rebound or guarding is noted.  No CVA tenderness is

noted. 

GENITOURINARY:  Deferred. 

EXTREMITIES:  Reveal no clubbing, cyanosis, or edema.  His left leg is still wrapped

with a sugar-tong like splint and an Ace wrap around it.  He has no significant

swelling and he has good capillary return in that left foot. 

NEUROLOGIC:  Grossly unremarkable.  No focal findings.



ASSESSMENT:  

1. Lateral malleolar fracture, nonoperative, followed by Dr. Rush.

2. Hypertension, stable.

3. Diabetes, stable.

4. Human immunodeficiency virus.

5. Hyperlipidemia.

6. Diabetic neuropathy.

7. Iron deficiency anemia.

8. Diastolic congestive heart failure.

9. Generalized weakness.



PLAN:  

1. Continue to monitor the patient's blood pressure closely.

2. Continue to monitor the patient's diabetes with Accu-Cheks a.c. and at bedtime.

3. Monitor the patient's anemia.

4. Follow the patient closely for signs and symptoms of congestive heart failure.

5. Continue physical therapy and occupational therapy.

6. Nonweightbearing on the left lower extremity.

7. Follow up with Dr. Rush in approximately one month in his office.

8. Continue supportive care with caring.







Job ID:  [id number]

## 2020-05-28 RX ADMIN — INSULIN LISPRO SCH: 100 INJECTION, SOLUTION INTRAVENOUS; SUBCUTANEOUS at 08:12

## 2020-05-28 RX ADMIN — INSULIN GLARGINE SCH UNITS: 100 INJECTION, SOLUTION SUBCUTANEOUS at 20:10

## 2020-05-28 RX ADMIN — INSULIN GLARGINE SCH UNITS: 100 INJECTION, SOLUTION SUBCUTANEOUS at 08:15

## 2020-05-28 RX ADMIN — INSULIN LISPRO SCH UNIT: 100 INJECTION, SOLUTION INTRAVENOUS; SUBCUTANEOUS at 12:13

## 2020-05-28 RX ADMIN — ASPIRIN SCH MG: 81 TABLET ORAL at 08:13

## 2020-05-28 RX ADMIN — INSULIN LISPRO SCH: 100 INJECTION, SOLUTION INTRAVENOUS; SUBCUTANEOUS at 17:26

## 2020-05-29 RX ADMIN — ASPIRIN SCH MG: 81 TABLET ORAL at 08:30

## 2020-05-29 RX ADMIN — INSULIN GLARGINE SCH UNITS: 100 INJECTION, SOLUTION SUBCUTANEOUS at 20:23

## 2020-05-29 RX ADMIN — INSULIN LISPRO SCH UNIT: 100 INJECTION, SOLUTION INTRAVENOUS; SUBCUTANEOUS at 11:48

## 2020-05-29 RX ADMIN — INSULIN GLARGINE SCH UNITS: 100 INJECTION, SOLUTION SUBCUTANEOUS at 08:32

## 2020-05-29 RX ADMIN — INSULIN LISPRO SCH: 100 INJECTION, SOLUTION INTRAVENOUS; SUBCUTANEOUS at 16:18

## 2020-05-29 RX ADMIN — INSULIN LISPRO SCH: 100 INJECTION, SOLUTION INTRAVENOUS; SUBCUTANEOUS at 08:29

## 2020-05-29 NOTE — PRG
DATE OF SERVICE:  05/29/2020



SUBJECTIVE:  Mr. Morrow is a well-developed, well-nourished, slightly obese

70-year-old white male, who fell at home.  He had a resultant left ankle fibular

fracture.  X-ray revealed it was in the lateral malleolar area.  He was placed in a

splint and transferred to [facility] in [place] where he was seen by Dr. Rush.  Dr. Rush evaluated the patient, felt that it was a nonsurgical

intervention.  The patient was also noted to be COVID-19, so was transferred back to

[facility] for PT, OT, nonweightbearing status, and pain management. 



The patient states he is actually doing very well and walked with his walker this

morning 10 feet, 12 feet, 7 feet. 



His sugars have actually been very good. 



Fasting sugar yesterday before supper 124, before bedtime 181.



PHYSICAL EXAMINATION:

VITAL SIGNS:  This morning reveal blood pressure 151/78, pulse 81 to 86,

respirations 18, O2 saturation 97% on room air, T-max 98.2. 

GENERAL:  This is a well-developed, well-nourished, 6 feet 4 inches, somewhat obese

white male, in no apparent distress at this time. 

HEENT:  Reveal normocephalic and nontraumatic cranium.  Pupils are equal, round, and

reactive.  Extraocular movements are intact.  Nose and throat are clear and dry. 

NECK:  Supple without masses, nodes, or bruits. 

CHEST:  Clear to auscultation.  No rales, no rhonchi, no wheezes, and no cough is

heard. 

HEART:  Reveals a regular rate and rhythm without murmurs, gallops, or rubs. 

ABDOMEN:  Obese, soft, nontender without organomegaly.  Normal bowel sounds are

noted in all 4 quadrants.  No rebound or guarding is noted.  No CVA tenderness is

noted. 

GENITOURINARY:  Deferred. 

EXTREMITIES:  Reveal no clubbing, cyanosis, or edema.  The patient's left leg is

still wrapped in sugar-tong leg splint.  Ace wrap is around it.  He has no boot to

wear at this time.  He has no significant swelling.  He has good capillary return. 

NEUROLOGIC:  No focal findings are noted.



ASSESSMENT:  

1. Left malleolar fracture, nonoperative, followed by Dr. Rush.

2. Hypertension, stable.

3. Diabetes, stable.

4. Human immunodeficiency virus.

5. Hyperlipidemia.

6. Diabetic neuropathy.

7. Iron deficiency anemia.

8. Diastolic congestive heart failure.

9. Generalized weakness.



PLAN:  

1. Continue to monitor the patient's diabetes with Accu-Cheks a.c. and h.s., which

is excellent. 

2. Continue to monitor the patient's blood pressure closely.

3. Monitor the patient's anemia.

4. Monitor the patient for signs and symptoms of CHF.

5. Continue to be nonweightbearing on the left lower extremity.

6. Follow up with Dr. Rush in his office per his recommendation.

7. Continue physical therapy and occupational therapy.

8. We will continue supportive care with caring.







Job ID:  [id number]

## 2020-05-30 RX ADMIN — ASPIRIN SCH MG: 81 TABLET ORAL at 08:21

## 2020-05-30 RX ADMIN — INSULIN LISPRO SCH: 100 INJECTION, SOLUTION INTRAVENOUS; SUBCUTANEOUS at 08:20

## 2020-05-30 RX ADMIN — INSULIN LISPRO SCH UNIT: 100 INJECTION, SOLUTION INTRAVENOUS; SUBCUTANEOUS at 11:50

## 2020-05-30 RX ADMIN — INSULIN LISPRO SCH UNIT: 100 INJECTION, SOLUTION INTRAVENOUS; SUBCUTANEOUS at 16:18

## 2020-05-30 RX ADMIN — INSULIN GLARGINE SCH UNITS: 100 INJECTION, SOLUTION SUBCUTANEOUS at 08:22

## 2020-05-30 RX ADMIN — INSULIN GLARGINE SCH UNITS: 100 INJECTION, SOLUTION SUBCUTANEOUS at 20:13

## 2020-05-30 NOTE — PRG
DATE OF SERVICE:  05/30/2020



SUBJECTIVE:  Mr. Morrow is a very pleasant 70-year-old white male.  Unfortunately,

he had diabetes and he fell at home.  He had a resultant left ankle fracture.  He

was splinted and sent from [facility] to [facility] in [place].  He was seen

in consultation by Dr. Rush, who felt that the patient was nonsurgical. 



He placed him in a posterior splint and sent him back to [facility]

after being proven to be COVID negative.  He was transferred here for physical

therapy, occupational therapy, nonweightbearing status, and to increase his strength

and stamina. 



Subjectively, the patient states he is doing well.  He is eating well.  He is not

constipated.  His pain is doing fairly well and he is trying to get better with his

transfers.  Yesterday, he walked 10 feet, 12 feet, and 7 feet with his

nonweightbearing status with a special walker. 



Sugars yesterday were 121 fasting, 174 before lunch, 103 before supper, and 161

before bedtime. 



Sugar this morning 123.



OBJECTIVE:  VITAL SIGNS:  Today reveal blood pressure 114/83, pulse 84, respirations

18, O2 saturations 95% to 97% on room air, and T-max 97.4. 

GENERAL:  This is a well-developed, well-nourished, somewhat obese white male, in no

apparent distress at this time. 

HEENT:  Reveals normocephalic and nontraumatic cranium.  Pupils are equal, round,

and reactive.  Extraocular movements are intact.  Nose and throat are clear, but

dry. 

NECK:  Supple without masses, nodes, or bruits. 

CHEST:  Clear to auscultation.  No rales, rhonchi, wheezes, or cough is heard. 

HEART:  Reveals a regular rate and rhythm without murmurs, gallops, or rubs. 

ABDOMEN:  Obese, soft, and nontender without organomegaly.  Normal bowel sounds are

noted in all 4 quadrants.  No rebound or guarding is noted. 

:  Deferred. 

EXTREMITIES:  Reveal no clubbing, cyanosis, or edema noted.  The patient's left leg

is still in a posterior splint.  He is nonweightbearing.  He has good capillary

refill.  No significant swelling. 

NEUROLOGIC:  No focal deficits are noted.



ASSESSMENT:  

1. Left lateral malleolar fracture, followed nonoperatively by Dr. Rush.

2. Hypertension.

3. Diabetes.

4. Human immunodeficiency virus.

5. Diabetic neuropathy.

6. Iron deficiency anemia.

7. Diastolic congestive heart failure.

8. Hyperlipidemia.

9. Generalized weakness.



PLAN:  

1. Continue to monitor the patient's blood pressure closely and adjust medications

if needed. 

2. Continue to monitor the patient's diabetes with Accu-Cheks a.c. and at bedtime.

3. Continue to encourage the patient not to eat any carbs with his diet.

4. Monitor the patient's anemia.

5. Follow closely for signs and symptoms of CHF.

6. Continue strict nonweightbearing on the left lower extremity.

7. Continue PT and OT.

8. Follow up with Dr. Rush per his recommendation.

9. Continue supportive care with caring.







Job ID:  [id number]

## 2020-05-30 NOTE — PRG
DATE OF SERVICE:  05/28/2020



SUBJECTIVE:  Mr. Morrow is a very pleasant 70-year-old diabetic male.  He

unfortunately fell at home and had a resultant left ankle fibular fracture.  X-ray

revealed a lateral malleolar fracture.  The patient was placed in a splint at [facility] and transferred to [facility] in [place], where he was seen by

Dr. Rush.  Dr. Rush felt the patient was nonsurgical and placed him in a

posterior splint.  The patient was COVID-19 negative and transferred back to [facility] for PT, OT, and nonweightbearing status. 



Subjectively, the patient states he is doing well with less pain. 



The patient's sister is in the room, who is his major caregiver, and she states that

he is doing fairly well. 



He is eating more than usual because he only eats two meals a day and has a very

restricted diet.  He states he is eating more because he wants to heal faster,

making sure that he is getting calcium type foods. 



OBJECTIVE:  VITAL SIGNS:  Blood pressure this morning is somewhat elevated 166/96.

Repeat done an hour later was 122/75.  Pulse ranges from 76 to 102, respirations 20,

O2 sat 93%, and T-max 97.3. 

GENERAL:  This is a well-developed, well-nourished, very large, somewhat obese white

male, in no apparent distress at this time. 

HEENT:  Normocephalic and nontraumatic cranium.  Pupils equally round and reactive.

Extraocular movements are intact.  Nose and throat are slightly dry. 

NECK:  Supple without masses, nodes or bruits. 

CHEST:  Clear to auscultation.  No rales, rhonchi or wheezes are heard. 

HEART:  Regular rate and rhythm without murmurs, gallops or rubs. 

ABDOMEN:  Obese, soft, nontender without organomegaly.  Normal bowel sounds are

noted in all 4 quadrants.  No rebound or guarding is noted.  No CVA tenderness

noted. 

GENITOURINARY:  Deferred. 

EXTREMITIES:  No clubbing, cyanosis or edema.  The patient's left leg has a

posterior splint on with an Ace wrap around it.  He is not supposed to put any

weight on it at all.  He is working well with physical therapy.  He is standing and

transferring. 

NEUROLOGIC.  Grossly unremarkable.



ASSESSMENT:  

1. Lateral malleolar fracture, nonoperative, followed by Dr. Rush.

2. Hypertension, varies.

3. Diabetes, stable.

4. Human immunodeficiency virus.

5. Hyperlipidemia.

6. Diabetic neuropathy.

7. Iron-deficiency anemia.

8. Diastolic congestive heart failure.

9. Generalized weakness.



PLAN:  

1. We will continue to monitor the patient's diabetes with Accu-Cheks a.c. and at

bedtime. 

2. Continue monitor the patient's diet.

3. Continue to monitor the patient's blood pressure closely and adjust medications

if needed. 

4. Monitor the patient's anemia.

5. Follow the patient closely for signs and symptoms of CHF.

6. Nonweightbearing status on the left lower extremity.

7. Continue PT and OT.

8. Follow up Dr. Rush per his recommendations.

9. Continue supportive care with caring.







Job ID:  [id number]

## 2020-05-31 RX ADMIN — INSULIN LISPRO SCH: 100 INJECTION, SOLUTION INTRAVENOUS; SUBCUTANEOUS at 08:54

## 2020-05-31 RX ADMIN — INSULIN GLARGINE SCH UNITS: 100 INJECTION, SOLUTION SUBCUTANEOUS at 08:55

## 2020-05-31 RX ADMIN — INSULIN LISPRO SCH: 100 INJECTION, SOLUTION INTRAVENOUS; SUBCUTANEOUS at 16:44

## 2020-05-31 RX ADMIN — INSULIN GLARGINE SCH UNITS: 100 INJECTION, SOLUTION SUBCUTANEOUS at 20:09

## 2020-05-31 RX ADMIN — ASPIRIN SCH MG: 81 TABLET ORAL at 08:55

## 2020-05-31 RX ADMIN — INSULIN LISPRO SCH: 100 INJECTION, SOLUTION INTRAVENOUS; SUBCUTANEOUS at 12:53

## 2020-05-31 NOTE — PRG
DATE OF SERVICE:  05/31/2020



SUBJECTIVE:  Mr. Morrow is a well-developed, well-nourished, slightly obese

70-year-old white male.  Unfortunately, he fell at home had a resultant left ankle

fracture.  He was splinted and sent from [facility] to [facility] in [place] where he was seen in consultation by Dr. Rush.  Dr. Rush

felt that the patient was nonsurgical.  I put him in a posterior splint and referred

him back to [facility] for nonweightbearing status.  He was

transferred here for physical therapy, occupational therapy and pain management. 



The patient states he slept well last night.  He is eating well.  He is not having

any problems.  His pain is under fairly good control.  It is Saturday, so he had no

therapy yesterday, and today is Sunday, he will have no therapy today.  He will

restart his therapy tomorrow.  Sugars yesterday were 123 before breakfast, 155

before lunch, 173 before supper, 131 before bedtime, which is certainly acceptable

for this patient. 



OBJECTIVE:  VITAL SIGNS:  Today reveal blood pressure 115/68, pulse 89, respirations

18, O2 saturation 94% to 98% on room air, T-max 97.2. 

GENERAL:  This is a well-developed, well-nourished, rather large white male, in no

apparent distress at this time. 

HEENT:  Normocephalic and nontraumatic cranium.  Pupils equally round and reactive.

Extraocular movements intact.  Nose and throat are slightly dry. 

NECK:  Supple without masses, nodes, or bruits. 

CHEST:  Clear to auscultation.  No rales, rhonchi, or wheezes are heard. 

HEART:  Reveals a regular rate and rhythm.  No murmurs, gallops, or rubs are noted. 

ABDOMEN:  Obese, soft, nontender without organomegaly.  Normal bowel sounds are

noted in all 4 quadrants.  No rebound or guarding is noted.  Umbilical hernia is

noted. 

GENITOURINARY:  Deferred. 

EXTREMITIES:  Reveal no clubbing, cyanosis, or edema.  The patient's left leg is

still in a posterior splint.  He continues to have good capillary refill.  No

significant swelling.  He is nonweightbearing. 



ASSESSMENT:  

1. Left lateral malleolar fracture, followed nonoperatively by Dr. Rush.

2. Hypertension.

3. Diabetes.

4. Diabetic neuropathy.

5. Diastolic congestive heart failure.

6. Human immunodeficiency virus.

7. Iron deficiency anemia.

8. Hyperlipidemia.

9. Generalized weakness.



PLAN:  

1. We will continue to monitor the patient's diabetes with Accu-Cheks a.c. and at

bedtime. 

2. Continue to monitor the patient's blood pressure closely and adjust medications

as needed. 

3. Encouraged the patient to follow his diet and watch out for too many carbs.

4. Monitor the patient's anemia.

5. Follow closely for signs and symptoms of CHF.

6. Continue strict nonweightbearing of left lower extremity.

7. Continue physical therapy and occupational therapy.

8. Follow up per Dr. Rush for his recommendation.

9. Continue supportive care with caring.







Job ID:  [id number]

## 2020-06-01 RX ADMIN — INSULIN GLARGINE SCH UNITS: 100 INJECTION, SOLUTION SUBCUTANEOUS at 20:10

## 2020-06-01 RX ADMIN — INSULIN LISPRO SCH UNIT: 100 INJECTION, SOLUTION INTRAVENOUS; SUBCUTANEOUS at 12:16

## 2020-06-01 RX ADMIN — INSULIN LISPRO SCH: 100 INJECTION, SOLUTION INTRAVENOUS; SUBCUTANEOUS at 17:43

## 2020-06-01 RX ADMIN — INSULIN GLARGINE SCH UNITS: 100 INJECTION, SOLUTION SUBCUTANEOUS at 09:01

## 2020-06-01 RX ADMIN — INSULIN LISPRO SCH: 100 INJECTION, SOLUTION INTRAVENOUS; SUBCUTANEOUS at 08:51

## 2020-06-01 RX ADMIN — ASPIRIN SCH MG: 81 TABLET ORAL at 08:55

## 2020-06-01 NOTE — PRG
DATE OF SERVICE:  06/01/2020



SUBJECTIVE:  Mr. Morrow is a well-developed, well-nourished 70-year-old white

male, who unfortunately fell at home.  He had a resultant left ankle fracture.  He

presented to the ER at [facility] and transferred to [facility].  He was seen in consultation by Dr. Rush there and felt to be

nonsurgical.  He had a posterior splint placed and referred back to [facility] for nonweightbearing status.  He is transferred here for pain

management, physical therapy, occupational therapy. 



The patient states he did well last night, and had no concerns or complaints. 



Over the weekend, he had no therapy, but he tried to get out of bed as much as

possible. 



He has already had therapy this morning. 



He is asking about a knee scooter since he is unable to put any weight on that side. 



Sugars yesterday were 107 before breakfast, 133 before lunch, 86 before supper, 167

before bedtime, and this morning, fasting was 70, which he got some Coke and then

went up to 245 and then has decreased down to 129. 



PHYSICAL EXAMINATION:

VITAL SIGNS:  Today reveal blood pressure 123/79, pulse 80, respirations 18, O2

saturations 97% on room air, T-max 98.2. 

GENERAL:  This is a well-developed, well-nourished, rather large white male, in no

apparent distress at this time. 

HEENT:  Reveals normocephalic and nontraumatic cranium.  Pupils are equal, round,

and reactive.  Extraocular movements are intact.  Nose and throat are slightly dry. 

NECK:  Supple without masses, nodes, or bruits. 

CHEST:  Clear to auscultation.  No rales, rhonchi, wheezes, or cough is heard. 

HEART:  Reveals a regular rate and rhythm without murmurs, gallops, or rubs. 

ABDOMEN:  Obese, soft, nontender without organomegaly.  Normal bowel sounds are

noted in all 4 quadrants.  No rebound or guarding is noted. 

:  Deferred. 

EXTREMITIES:  Reveal no clubbing, cyanosis, or edema.  He does have his left leg

still in a posterior splint.  He continues to have good capillary refill.  No

significant swelling is noted.  He is nonweightbearing. 



ASSESSMENT:  

1. Left lateral malleolar fracture, followed nonoperatively by Dr. Rush.

2. Diabetes type 2.

3. Hypertension.

4. Diabetic neuropathy.

5. Diastolic congestive heart failure.

6. Iron deficiency anemia.

7. Human immunodeficiency virus.

8. Hyperlipidemia.

9. Generalized weakness.



PLAN:  

1. The patient will have labs tomorrow and we will send copies of those labs to Dr. Burrell and SHAYLEE Salgado. 

2. Labs tomorrow include comprehensive metabolic panel, CBC, and a PT/INR.

3. Continue to monitor the patient's diabetes with Accu-Cheks a.c. and at bedtime.

4. Continue to monitor the patient's blood pressure closely.

5. Encourage the patient to follow his diet and continue to limit his carbs.

6. Monitor the patient's anemia.

7. Followup for signs and symptoms of congestive heart failure.

8. Continue strict nonweightbearing of left lower extremity.

9. Continue PT and OT.

10. Follow up with Dr. Rush per his recommendation.

11. Continue supportive care.







Job ID:  [id number]

## 2020-06-02 LAB
ALBUMIN SERPL BCG-MCNC: 3.8 G/DL (ref 3.4–4.8)
ALP SERPL-CCNC: 113 U/L (ref 40–110)
ALT SERPL W P-5'-P-CCNC: 30 U/L (ref 8–55)
ANION GAP SERPL CALC-SCNC: 14 MMOL/L (ref 10–20)
AST SERPL-CCNC: 21 U/L (ref 5–34)
BASOPHILS # BLD AUTO: 0.1 THOU/UL (ref 0–0.2)
BASOPHILS NFR BLD AUTO: 1 % (ref 0–1)
BILIRUB SERPL-MCNC: 0.3 MG/DL (ref 0.2–1.2)
BUN SERPL-MCNC: 39 MG/DL (ref 8.4–25.7)
CALCIUM SERPL-MCNC: 9.7 MG/DL (ref 7.8–10.44)
CHLORIDE SERPL-SCNC: 101 MMOL/L (ref 98–107)
CO2 SERPL-SCNC: 27 MMOL/L (ref 23–31)
CREAT CL PREDICTED SERPL C-G-VRATE: 73 ML/MIN (ref 70–130)
EOSINOPHIL # BLD AUTO: 0.2 THOU/UL (ref 0–0.7)
EOSINOPHIL NFR BLD AUTO: 3.5 % (ref 0–10)
GLOBULIN SER CALC-MCNC: 3.5 G/DL (ref 2.4–3.5)
GLUCOSE SERPL-MCNC: 97 MG/DL (ref 80–115)
HGB BLD-MCNC: 9.8 G/DL (ref 14–18)
INR PPP: 1.3
LYMPHOCYTES # BLD AUTO: 1.7 THOU/UL (ref 1.2–3.4)
LYMPHOCYTES NFR BLD AUTO: 27.1 % (ref 21–51)
MCH RBC QN AUTO: 25.1 PG (ref 27–31)
MCV RBC AUTO: 86.2 FL (ref 78–98)
MONOCYTES # BLD AUTO: 0.5 THOU/UL (ref 0.11–0.59)
MONOCYTES NFR BLD AUTO: 8.2 % (ref 0–10)
NEUTROPHILS # BLD AUTO: 3.7 THOU/UL (ref 1.4–6.5)
NEUTROPHILS NFR BLD AUTO: 60.3 % (ref 42–75)
PLATELET # BLD AUTO: 157 THOU/UL (ref 130–400)
POTASSIUM SERPL-SCNC: 4.7 MMOL/L (ref 3.5–5.1)
PROTHROMBIN TIME: 15.8 SEC (ref 12–14.7)
RBC # BLD AUTO: 3.91 MILL/UL (ref 4.7–6.1)
SODIUM SERPL-SCNC: 137 MMOL/L (ref 136–145)
WBC # BLD AUTO: 6.2 THOU/UL (ref 4.8–10.8)

## 2020-06-02 RX ADMIN — INSULIN LISPRO SCH: 100 INJECTION, SOLUTION INTRAVENOUS; SUBCUTANEOUS at 08:32

## 2020-06-02 RX ADMIN — INSULIN GLARGINE SCH UNITS: 100 INJECTION, SOLUTION SUBCUTANEOUS at 20:14

## 2020-06-02 RX ADMIN — INSULIN LISPRO SCH UNIT: 100 INJECTION, SOLUTION INTRAVENOUS; SUBCUTANEOUS at 12:05

## 2020-06-02 RX ADMIN — INSULIN LISPRO SCH: 100 INJECTION, SOLUTION INTRAVENOUS; SUBCUTANEOUS at 16:55

## 2020-06-02 RX ADMIN — INSULIN GLARGINE SCH UNITS: 100 INJECTION, SOLUTION SUBCUTANEOUS at 08:19

## 2020-06-02 RX ADMIN — ASPIRIN SCH MG: 81 TABLET ORAL at 08:18

## 2020-06-02 NOTE — PRG
DATE OF SERVICE:  06/02/2020



SUBJECTIVE:  Mr. Morrow is a well-developed, well-nourished 70-year-old white

male, who fell at home.  He had a resultant left ankle fracture and presented to the

ER at [facility].  He is transferred to [facility] in [place] and seen

in consultation by Dr. Rush.  Dr. Rush thought he was non-surgical and so he

placed a posterior splint and referred him back to [facility].  He

is here for nonweightbearing physical therapy and occupational therapy on that

ankle.  He is also here for pain control. 



The patient states he is doing well and had good therapy yesterday.  He still has a

lot of balance issues.  I did talk with Physical Therapy by getting him a knee

scooter to get around on, but they felt that he was too tentative and most likely

that would be dangerous for him, so we will not do that. 



OBJECTIVE:  VITAL SIGNS:  Today reveal blood pressure 122/71, pulse 76, respirations

18, O2 saturation 95% on room air, T-max 97.9. 

GENERAL:  This is a well-developed, well-nourished, 6 feet and 4 inches rather large

white male, in no apparent distress at this time. 

HEENT:  Normocephalic and nontraumatic cranium.  Pupils are equal, round, and

reactive.  Extraocular movements are intact.  Nose and throat are slightly dry, but

clear. 

NECK:  Supple without masses, nodes, or bruits. 

CHEST:  Clear to auscultation.  No rales, rhonchi, wheezes, or cough is heard. 

HEART:  Reveals a regular rate and rhythm without murmurs, gallops, or rubs. 

ABDOMEN:  Soft and nontender without organomegaly.  Normal bowel sounds are noted in

all 4 quadrants, and no rebound or guarding is noted. 

:  Deferred. 

EXTREMITIES:  Reveal no clubbing, cyanosis, or edema.  He has good capillary refill

in the left leg and the left leg is still in a posterior splint.  He is

nonweightbearing. 



ASSESSMENT:  

1. Left lateral malleolar fracture, followed nonoperatively by Dr. Rush.

2. Diabetes type 2, under fairly good control.

3. Hypertension, stable.

4. Diabetic neuropathy.

5. Diastolic congestive heart failure.

6. Iron deficiency anemia.

7. Hyperlipidemia.

8. Human immunodeficiency virus.

9. Generalized weakness.



PLAN:  

1. The patient did have labs this morning and they were actually pretty good.  He is

still anemic at 9.8 and 33.7.  His creatinine is 1.53.  His sugars yesterday had a

high of 177, but all the rest were 79, 129, 144. 

2. Continue to monitor the patient's diabetes with Accu-Cheks a.c. and at bedtime.

3. Continue to monitor the patient's blood pressure closely.

4. Encourage the patient to continue to follow on low carb diet.

5. Monitor the patient's anemia.

6. Followup for signs and symptoms of congestive heart failure.

7. Continue strict nonweightbearing on the left lower extremity.

8. Continue PT and OT.

9. Follow up with Dr. Rush for his recommendation.

10. Continue supportive care.







Job ID:  [id number]

## 2020-06-03 RX ADMIN — INSULIN LISPRO SCH: 100 INJECTION, SOLUTION INTRAVENOUS; SUBCUTANEOUS at 08:04

## 2020-06-03 RX ADMIN — INSULIN LISPRO SCH: 100 INJECTION, SOLUTION INTRAVENOUS; SUBCUTANEOUS at 16:43

## 2020-06-03 RX ADMIN — ASPIRIN SCH MG: 81 TABLET ORAL at 08:04

## 2020-06-03 RX ADMIN — INSULIN LISPRO SCH: 100 INJECTION, SOLUTION INTRAVENOUS; SUBCUTANEOUS at 11:50

## 2020-06-03 RX ADMIN — INSULIN GLARGINE SCH UNITS: 100 INJECTION, SOLUTION SUBCUTANEOUS at 20:57

## 2020-06-03 RX ADMIN — INSULIN GLARGINE SCH UNITS: 100 INJECTION, SOLUTION SUBCUTANEOUS at 08:03

## 2020-06-03 NOTE — CT
CT BRAIN WITHOUT CONTRAST:

6/3/20

 

HISTORY: 

Unresponsive episode. 

 

COMPARISON: 

3/6/17.

 

FINDINGS: 

No evidence of acute infarct, hemorrhage, midline shift or abnormal extra-axial fluid collections are
 seen. The ventricular size is stable and the basilar cisterns patent. The bony calvarium is intact. 
The visualized paranasal sinuses and mastoid air cells are well aerated. 

 

IMPRESSION: 

No CT evidence of acute intracranial process. 

 

POS: MZA

## 2020-06-03 NOTE — PRG
DATE OF SERVICE:  06/03/2020



SUBJECTIVE:  Mr. Morrow is resting in bed.  He denies any complaints.  Just

finished his lunch.  Pain is controlled. 



OBJECTIVE:  VITAL SIGNS:  He is afebrile, heart rate 81, respirations 18, oxygen

saturation 99% on room air, blood pressure 102/60. 

CARDIOVASCULAR SYSTEM:  S1 and S2 plus. 

RESPIRATORY SYSTEM:  Normal vesicular breath sounds. 

ABDOMEN:  Soft, nontender.  Ventral hernia is present.  Bowel sounds heard in all

quadrants. 

EXTREMITIES:  Without cyanosis or clubbing.  Left leg and ankle in a soft cast. 

CENTRAL NERVOUS SYSTEM:  Grossly nonfocal.



IMPRESSION:  

1. Left lateral malleolar fracture, on conservative management.

2. Diabetes mellitus type 2.

3. Hypertension.

4. Chronic diastolic congestive heart failure.

5. Dyslipidemia.

6. Deconditioning.



PLAN:  

1. Continue current medications.

2. Orthopedic precautions.

3. Heart healthy diet.

4. Monitor blood sugars.

5. DVT and stress ulcer prophylaxis.

6. Decubitus precautions.

7. Stress ulcer prophylaxis.

8. Routine laboratory values.

9. Discussed with the patient and nursing in detail.  All questions answered.





Job ID:  [id number]

## 2020-06-04 LAB
ALBUMIN SERPL BCG-MCNC: 4.5 G/DL (ref 3.4–4.8)
ALP SERPL-CCNC: 136 U/L (ref 40–110)
ALT SERPL W P-5'-P-CCNC: 31 U/L (ref 8–55)
ANION GAP SERPL CALC-SCNC: 14 MMOL/L (ref 10–20)
AST SERPL-CCNC: 25 U/L (ref 5–34)
BASOPHILS # BLD AUTO: 0.1 THOU/UL (ref 0–0.2)
BASOPHILS NFR BLD AUTO: 0.8 % (ref 0–1)
BILIRUB SERPL-MCNC: 0.4 MG/DL (ref 0.2–1.2)
BUN SERPL-MCNC: 38 MG/DL (ref 8.4–25.7)
CALCIUM SERPL-MCNC: 10 MG/DL (ref 7.8–10.44)
CHLORIDE SERPL-SCNC: 97 MMOL/L (ref 98–107)
CO2 SERPL-SCNC: 30 MMOL/L (ref 23–31)
CREAT CL PREDICTED SERPL C-G-VRATE: 59 ML/MIN (ref 70–130)
EOSINOPHIL # BLD AUTO: 0.2 THOU/UL (ref 0–0.7)
EOSINOPHIL NFR BLD AUTO: 2.1 % (ref 0–10)
GLOBULIN SER CALC-MCNC: 3.5 G/DL (ref 2.4–3.5)
GLUCOSE SERPL-MCNC: 73 MG/DL (ref 80–115)
HBSAG INDEX: 0.19 S/CO (ref 0–0.99)
HBV SURFACE AB SERPL IA-ACNC: 423.57 MIU/ML
HEP C INDEX: 0.04 S/CO (ref 0–0.79)
HGB BLD-MCNC: 11.7 G/DL (ref 14–18)
LYMPHOCYTES # BLD AUTO: 1.8 THOU/UL (ref 1.2–3.4)
LYMPHOCYTES NFR BLD AUTO: 19.6 % (ref 21–51)
MCH RBC QN AUTO: 27.3 PG (ref 27–31)
MCV RBC AUTO: 83.5 FL (ref 78–98)
MONOCYTES # BLD AUTO: 0.7 THOU/UL (ref 0.11–0.59)
MONOCYTES NFR BLD AUTO: 7.1 % (ref 0–10)
NEUTROPHILS # BLD AUTO: 6.4 THOU/UL (ref 1.4–6.5)
NEUTROPHILS NFR BLD AUTO: 70.3 % (ref 42–75)
PLATELET # BLD AUTO: 244 THOU/UL (ref 130–400)
POTASSIUM SERPL-SCNC: 5.1 MMOL/L (ref 3.5–5.1)
RBC # BLD AUTO: 4.29 MILL/UL (ref 4.7–6.1)
SODIUM SERPL-SCNC: 136 MMOL/L (ref 136–145)
WBC # BLD AUTO: 9.1 THOU/UL (ref 4.8–10.8)

## 2020-06-04 RX ADMIN — ASPIRIN SCH MG: 81 TABLET ORAL at 08:45

## 2020-06-04 RX ADMIN — INSULIN LISPRO SCH UNIT: 100 INJECTION, SOLUTION INTRAVENOUS; SUBCUTANEOUS at 12:17

## 2020-06-04 RX ADMIN — INSULIN LISPRO SCH: 100 INJECTION, SOLUTION INTRAVENOUS; SUBCUTANEOUS at 08:44

## 2020-06-04 RX ADMIN — INSULIN GLARGINE SCH UNITS: 100 INJECTION, SOLUTION SUBCUTANEOUS at 08:45

## 2020-06-04 RX ADMIN — INSULIN LISPRO SCH: 100 INJECTION, SOLUTION INTRAVENOUS; SUBCUTANEOUS at 17:14

## 2020-06-04 RX ADMIN — INSULIN GLARGINE SCH UNITS: 100 INJECTION, SOLUTION SUBCUTANEOUS at 20:39

## 2020-06-04 NOTE — PRG
DATE OF SERVICE:  06/04/2020



SUBJECTIVE:  Mr. Morrow is doing well, back to his baseline.  He apparently had an

episode of syncope while working with therapy.  It sounded vasovagal, but nursing

felt that he was weak on the left side, so a stat CT brain was done.  It did not

show any acute changes. __________pretty much back to his baseline now. 



OBJECTIVE:  VITAL SIGNS:  He is afebrile.  Heart rate 75, respirations 18, oxygen

saturation 95% on room air, blood pressure 115/56. 

CARDIOVASCULAR SYSTEM:  S1 and S2 plus. 

RESPIRATORY SYSTEM:  Normal vesicular breath sounds. 

ABDOMEN:  Soft, obese, nontender.  Bowel sounds heard in all quadrants.  Large

ventral hernias present and small umbilical hernia. 

EXTREMITIES:  Without cyanosis or clubbing.  Left leg in a soft splint. 

CENTRAL NERVOUS SYSTEM:  Improving deconditioning.



IMPRESSION:  

1. Left lateral malleolus fracture on conservative management.

2. Episode of syncope yesterday, likely vasovagal.

3. Large ventral hernia and small umbilical hernia.

4. Diabetes mellitus, type 2.

5. Hypertension.

6. Dyslipidemia.

7. Deconditioning.

8. Chronic diastolic congestive heart failure.



PLAN:  

1. Continue current medications.

2. Orthopedic precautions.

3. Physical therapy.

4. Nutritional support.

5. DVT and stress ulcer prophylaxis.

6. Decubitus precaution.

7. Accu-Cheks with sliding scale coverage.

8. 1800-calorie heart-healthy ADA diet.

9. Routine laboratory values.







Job ID:  [id number]

## 2020-06-05 RX ADMIN — INSULIN LISPRO SCH: 100 INJECTION, SOLUTION INTRAVENOUS; SUBCUTANEOUS at 08:55

## 2020-06-05 RX ADMIN — INSULIN LISPRO SCH UNIT: 100 INJECTION, SOLUTION INTRAVENOUS; SUBCUTANEOUS at 11:53

## 2020-06-05 RX ADMIN — INSULIN GLARGINE SCH UNITS: 100 INJECTION, SOLUTION SUBCUTANEOUS at 08:55

## 2020-06-05 RX ADMIN — INSULIN GLARGINE SCH UNITS: 100 INJECTION, SOLUTION SUBCUTANEOUS at 21:02

## 2020-06-05 RX ADMIN — ASPIRIN SCH MG: 81 TABLET ORAL at 08:46

## 2020-06-05 RX ADMIN — INSULIN LISPRO SCH: 100 INJECTION, SOLUTION INTRAVENOUS; SUBCUTANEOUS at 16:42

## 2020-06-05 NOTE — PRG
DATE OF SERVICE:  06/05/2020



SUBJECTIVE:  Mr. Mororw is doing well.  His sister is in the room.  No further

syncope.  Denies any concerns or questions. 



OBJECTIVE:  VITAL SIGNS:  He is afebrile, heart rate 82, respirations 18, oxygen

saturation 100% on room air, and blood pressure 135/78. 

CARDIOVASCULAR:  S1 and S2 plus. 

RESPIRATORY:  Normal vesicular breath sounds. 

ABDOMEN:  Soft, nontender.  Bowel sounds heard in all quadrants. 

EXTREMITIES:  Without cyanosis or clubbing.  Left leg in a splint.



IMPRESSION:  

1. Left lateral malleolus fracture, on conservative management.

2. Diabetes mellitus, type 2.

3. Cirrhosis of the liver with possible ascites.

4. Ventral hernia and small umbilical hernia.

5. Diastolic congestive heart failure.

6. Episode of vasovagal syncope.

7. Hypertension.

8. Dyslipidemia.

9. Deconditioning.



PLAN:  

1. Continue current medications.

2. 1800 calorie heart healthy ADA diet.

3. Accu-Cheks with sliding scale coverage.

4. Monitor blood pressure and adjust medications as needed.

5. Orthopedic precautions.

6. DVT prophylaxis.

7. Decubitus precautions.

8. Therapy as tolerated.

9. Routine laboratory values.

10. Dr. Pearson on-call this weekend.







Job ID:  [id number]

## 2020-06-06 RX ADMIN — INSULIN LISPRO SCH: 100 INJECTION, SOLUTION INTRAVENOUS; SUBCUTANEOUS at 17:10

## 2020-06-06 RX ADMIN — INSULIN GLARGINE SCH UNITS: 100 INJECTION, SOLUTION SUBCUTANEOUS at 20:57

## 2020-06-06 RX ADMIN — INSULIN LISPRO SCH UNIT: 100 INJECTION, SOLUTION INTRAVENOUS; SUBCUTANEOUS at 12:35

## 2020-06-06 RX ADMIN — ASPIRIN SCH MG: 81 TABLET ORAL at 08:18

## 2020-06-06 RX ADMIN — INSULIN LISPRO SCH: 100 INJECTION, SOLUTION INTRAVENOUS; SUBCUTANEOUS at 08:21

## 2020-06-06 RX ADMIN — INSULIN GLARGINE SCH UNITS: 100 INJECTION, SOLUTION SUBCUTANEOUS at 08:24

## 2020-06-06 NOTE — PRG
DATE OF SERVICE:  06/06/2020



SUBJECTIVE:  Patient feels well, lying in the bed with no complaints.  Pain in his

ankle in the day and occasionally at night.  He is having no problem with shortness

of breath or chest pain, nausea, vomiting, or diarrhea.  Accu-Cheks shown ranged

from 101 to 208. 



OBJECTIVE:  LUNGS:  Clear. 

CARDIAC:  Regular rhythm. 

ABDOMEN:  Soft, nontender. 

EXTREMITIES:  Left foot is in a bandage.



ASSESSMENT:  

1. Resolving left lateral malleolus fracture, on conservative treatment.

2. Type 2 diabetes, fair control.

3. Diastolic heart failure, stable.

4. Hypertension, controlled to goal.

5. Severe deconditioning, cooperating minimally with therapy.



PLAN:  

1. Continue PT, OT.

2. Review labs and therapy notes.

3. Continue to monitor vital signs.

4. Continue Accu-Cheks to monitor and titrate and control diabetes.







Job ID:  [id number]

## 2020-06-07 LAB
%CD4 (HELPER/INDUCER): 15.3 % (ref 30.8–58.5)
CD3+CD4+ CELLS # BLD: 260 /UL (ref 359–1519)
LYMPHOCYTES # BLD AUTO: 1.7 X10E3/UL (ref 0.7–3.1)
NRBC BLD AUTO-RTO: (no result) %
TOTAL LYMPHOCYTE: 21 %
WBC TOTAL COUNT: 8.5 X10E3/UL (ref 3.4–10.8)

## 2020-06-07 RX ADMIN — INSULIN LISPRO SCH: 100 INJECTION, SOLUTION INTRAVENOUS; SUBCUTANEOUS at 18:13

## 2020-06-07 RX ADMIN — INSULIN LISPRO SCH UNIT: 100 INJECTION, SOLUTION INTRAVENOUS; SUBCUTANEOUS at 12:20

## 2020-06-07 RX ADMIN — INSULIN GLARGINE SCH UNITS: 100 INJECTION, SOLUTION SUBCUTANEOUS at 08:46

## 2020-06-07 RX ADMIN — INSULIN LISPRO SCH: 100 INJECTION, SOLUTION INTRAVENOUS; SUBCUTANEOUS at 08:46

## 2020-06-07 RX ADMIN — INSULIN GLARGINE SCH UNITS: 100 INJECTION, SOLUTION SUBCUTANEOUS at 20:54

## 2020-06-07 RX ADMIN — ASPIRIN SCH MG: 81 TABLET ORAL at 08:45

## 2020-06-07 NOTE — PRG
DATE OF SERVICE:  06/07/2020



SUBJECTIVE:  The patient is sitting up in chair, eating his supper, states he feels

well.  He has had great care and very pleased with his care.  The patient states he

is still having significant tenderness in his left ankle on any movement and is only

10 days post fracture. 



OBJECTIVE:  VITAL SIGNS:  Temperature 96.6, pulse 80, respirations 18, O2 sats 97%

on room air, and blood pressure 120/67. 

LUNGS:  Clear. 

CARDIAC:  Regular rhythm. 

ABDOMEN:  Soft and nontender. 

SKIN AND EXTREMITIES:  Left ankle wrapped and bandaged.



ASSESSMENT:  

1. Left lateral malleolus fracture, nonoperative treatment with splint.

2. Diabetes, type 2, controlled to goal.

3. Cirrhosis of the liver, stable.

4. Diastolic heart failure, stable.

5. Hypertension, controlled to goal.



PLAN:  

1. Continue PT/OT.

2. Continue Accu-Cheks to monitor and titrate and control diabetes.

3. Continue to monitor and control blood pressure.

4. Continue to monitor for exacerbation of diastolic heart failure.

5. Continue DVT prophylaxis.







Job ID:  [id number]

## 2020-06-08 RX ADMIN — INSULIN LISPRO SCH UNIT: 100 INJECTION, SOLUTION INTRAVENOUS; SUBCUTANEOUS at 11:46

## 2020-06-08 RX ADMIN — INSULIN GLARGINE SCH UNITS: 100 INJECTION, SOLUTION SUBCUTANEOUS at 20:56

## 2020-06-08 RX ADMIN — ASPIRIN SCH MG: 81 TABLET ORAL at 08:17

## 2020-06-08 RX ADMIN — INSULIN LISPRO SCH: 100 INJECTION, SOLUTION INTRAVENOUS; SUBCUTANEOUS at 16:57

## 2020-06-08 RX ADMIN — INSULIN GLARGINE SCH UNITS: 100 INJECTION, SOLUTION SUBCUTANEOUS at 08:20

## 2020-06-08 RX ADMIN — INSULIN LISPRO SCH: 100 INJECTION, SOLUTION INTRAVENOUS; SUBCUTANEOUS at 11:21

## 2020-06-08 NOTE — PRG
DATE OF SERVICE:  06/08/2020



SUBJECTIVE:  Mr. Morrow is a well-developed, well-nourished 70-year-old white

male.  Unfortunately, he fell at home and had a resultant left ankle bimalleolar

fracture.  Presented to the emergency room at [facility] and

transferred to [facility] in [place].  He was seen by Dr. Rush who felt

that he was a nonsurgical case.  He placed a posterior splint and referred him back

to [facility] for nonweightbearing physical therapy and occupational

therapy. 



The patient states he is doing well.  His pain is fairly well controlled, but he has

muscle spasms.  They would rather have a muscle relaxer rather than any pain

medication. 



We will start him on some cyclobenzaprine 10 mg t.i.d. p.r.n.



PHYSICAL EXAMINATION:

VITAL SIGNS:  Today reveal blood pressure 115/71, pulse 92, O2 saturation 94% to 98%

on room air, and T-max 99.3. 

GENERAL:  This is a well-developed, well-nourished, pleasant white male, in no

apparent distress at this time. 

HEENT:  Reveals normocephalic and nontraumatic cranium.  Pupils are equal, round,

and reactive.  Extraocular movements are intact.  Nose and throat are slightly dry. 

NECK:  Supple without masses, nodes, or bruits. 

CHEST:  Clear to auscultation.  No rales, rhonchi, wheezes, or cough is heard. 

HEART:  Reveals a regular rate and rhythm without murmurs, gallops, or rubs. 

ABDOMEN:  Soft and nontender without organomegaly.  Normal bowel sounds are noted in

all 4 quadrants.  No rebound or guarding was noted. 

:  Deferred. 

EXTREMITIES:  Reveal no clubbing, cyanosis, or edema.  The patient has still good

capillary refill in his left leg and still has left posterior splint there.  Seems

like he has less swelling.  He is still nonweightbearing. 



ASSESSMENT:  

1. Left lateral malleolar fracture, followed nonoperatively by Dr. Rush.

2. Diabetes type 2, still under fairly good control.

3. Hypertension, stable.

4. Diabetic neuropathy.

5. Diastolic congestive heart failure, stable.

6. Iron deficiency anemia.

7. Hyperlipidemia.

8. Human immunodeficiency virus.

9. Muscle spasms rather than pain.

10. Generalized weakness.



PLAN:  

1. We will start the patient on some cyclobenzaprine 10 mg up to t.i.d. p.r.n.

muscle spasms. 

2. Continue to monitor the patient's diabetes with Accu-Cheks a.c. and at bedtime.

3. Continue to monitor the patient's blood pressure closely and adjust medications

as needed. 

4. Continue to encourage the patient to be on a low carb diet.

5. Monitor the patient's anemia.

6. We will do labs tomorrow morning.

7. Continue to follow up for signs and symptoms of congestive heart failure.

8. Continue strict nonweightbearing on the left lower extremity.

9. PT and OT.

10. Follow up with Dr. Rush.

11. Continue supportive care.







Job ID:  [id number]

## 2020-06-09 LAB
HIV1 RNA # SERPL NAA+PROBE: <20 COPIES/ML
LOG10 HIV-1 RNA: (no result)

## 2020-06-09 RX ADMIN — INSULIN LISPRO SCH: 100 INJECTION, SOLUTION INTRAVENOUS; SUBCUTANEOUS at 08:46

## 2020-06-09 RX ADMIN — INSULIN GLARGINE SCH UNITS: 100 INJECTION, SOLUTION SUBCUTANEOUS at 20:45

## 2020-06-09 RX ADMIN — INSULIN GLARGINE SCH UNITS: 100 INJECTION, SOLUTION SUBCUTANEOUS at 08:51

## 2020-06-09 RX ADMIN — INSULIN LISPRO SCH: 100 INJECTION, SOLUTION INTRAVENOUS; SUBCUTANEOUS at 17:33

## 2020-06-09 RX ADMIN — ASPIRIN SCH MG: 81 TABLET ORAL at 08:41

## 2020-06-09 RX ADMIN — INSULIN LISPRO SCH UNIT: 100 INJECTION, SOLUTION INTRAVENOUS; SUBCUTANEOUS at 11:41

## 2020-06-09 NOTE — PRG
DATE OF SERVICE:  06/09/2020



SUBJECTIVE:  Mr. Morrow is a well-developed, well-nourished, very pleasant

70-year-old white male, who fell at home and had a resultant left bimalleolar ankle

fracture.  He was transferred to [facility] and was seen by with Dr. Rush who felt like he was a nonsurgical case.  He was placed in a posterior

splint and referred back to [facility] for nonweightbearing physical

therapy and occupational therapy. 



The patient states he is doing well and having less pain in his ankle.  He has some

muscle spasms and requested some muscle relaxers, but no pain medications. 



OBJECTIVE:  VITAL SIGNS:  Today reveal blood pressure 117/70, pulse 84, respirations

18, O2 saturation 92% to 98% on room air, T-max 96.6. 

GENERAL:  This is a well-developed, well-nourished, rather large white male, in no

apparent distress at this time. 

HEENT:  Reveals normocephalic and nontraumatic cranium.  Pupils are equal, round,

and reactive.  Extraocular movements are intact.  Nose and throat are slightly dry. 

NECK:  Supple without masses, nodes, or bruits. 

CHEST:  Clear to auscultation.  No rales, rhonchi, or wheezes are heard. 

HEART:  Reveals a regular rate and rhythm without murmurs, gallops, or rubs. 

ABDOMEN:  Soft and nontender without organomegaly.  Normal bowel sounds are noted in

all 4 quadrants.  No rebound or guarding is noted. 

GENITOURINARY:  Deferred. 

EXTREMITIES:  Reveal no clubbing, cyanosis, or edema.  The patient still has good

capillary refill in his left leg with posterior splint.  Swelling has continued to

go down. 



ASSESSMENT:  

1. Left lateral malleolar fracture, nonoperatively followed by Dr. Rush.

2. Diabetes type 2, still under fairly good control.

3. Hypertension, stable.

4. Diabetic neuropathy.

5. Diastolic congestive heart failure, stable.

6. Iron deficiency anemia.

7. Hyperlipidemia.

8. Human immunodeficiency virus.

9. Muscle spasm rather than pain.

10. Generalized weakness.



PLAN:  

1. The patient was started on cyclobenzaprine 10 mg with the patient states he took

one last night and did well. 

2. Monitor the patient's diabetes with Accu-Cheks a.c. and at bedtime.

3. Continue to monitor the patient's blood pressure closely.

4. Continue to encourage the patient to be on a low carb diet.

5. Monitor the patient's anemia.

6. Labs will be done tomorrow.

7. Continue to followup for signs and symptoms of congestive heart failure.

8. Continue strict nonweightbearing in the left lower extremity.

9. PT and OT.

10. Follow up with Dr. Rush.

11. Continue supportive care.







Job ID:  [id number]

## 2020-06-10 LAB
ALBUMIN SERPL BCG-MCNC: 3.7 G/DL (ref 3.4–4.8)
ALP SERPL-CCNC: 107 U/L (ref 40–110)
ALT SERPL W P-5'-P-CCNC: 32 U/L (ref 8–55)
ANION GAP SERPL CALC-SCNC: 13 MMOL/L (ref 10–20)
AST SERPL-CCNC: 26 U/L (ref 5–34)
BASOPHILS # BLD AUTO: 0.1 THOU/UL (ref 0–0.2)
BASOPHILS NFR BLD AUTO: 1 % (ref 0–1)
BILIRUB SERPL-MCNC: 0.3 MG/DL (ref 0.2–1.2)
BUN SERPL-MCNC: 29 MG/DL (ref 8.4–25.7)
CALCIUM SERPL-MCNC: 9.6 MG/DL (ref 7.8–10.44)
CHLORIDE SERPL-SCNC: 100 MMOL/L (ref 98–107)
CO2 SERPL-SCNC: 28 MMOL/L (ref 23–31)
CREAT CL PREDICTED SERPL C-G-VRATE: 75 ML/MIN (ref 70–130)
EOSINOPHIL # BLD AUTO: 0.2 THOU/UL (ref 0–0.7)
EOSINOPHIL NFR BLD AUTO: 3 % (ref 0–10)
GLOBULIN SER CALC-MCNC: 3.2 G/DL (ref 2.4–3.5)
GLUCOSE SERPL-MCNC: 89 MG/DL (ref 80–115)
HGB BLD-MCNC: 9.6 G/DL (ref 14–18)
LYMPHOCYTES # BLD AUTO: 1.4 THOU/UL (ref 1.2–3.4)
LYMPHOCYTES NFR BLD AUTO: 20.7 % (ref 21–51)
MCH RBC QN AUTO: 25 PG (ref 27–31)
MCV RBC AUTO: 85 FL (ref 78–98)
MDIFF COMPLETE?: YES
MONOCYTES # BLD AUTO: 0.6 THOU/UL (ref 0.11–0.59)
MONOCYTES NFR BLD AUTO: 8.5 % (ref 0–10)
NEUTROPHILS # BLD AUTO: 4.4 THOU/UL (ref 1.4–6.5)
NEUTROPHILS NFR BLD AUTO: 66.7 % (ref 42–75)
OVALOCYTES BLD QL SMEAR: (no result) (100X)
PLATELET # BLD AUTO: 149 THOU/UL (ref 130–400)
POTASSIUM SERPL-SCNC: 4.4 MMOL/L (ref 3.5–5.1)
RBC # BLD AUTO: 3.84 MILL/UL (ref 4.7–6.1)
SODIUM SERPL-SCNC: 137 MMOL/L (ref 136–145)
SPHEROCYTES BLD QL SMEAR: (no result) (100X)
TARGETS BLD QL SMEAR: (no result) (100X)
WBC # BLD AUTO: 6.6 THOU/UL (ref 4.8–10.8)

## 2020-06-10 RX ADMIN — ASPIRIN SCH MG: 81 TABLET ORAL at 08:54

## 2020-06-10 RX ADMIN — INSULIN GLARGINE SCH UNITS: 100 INJECTION, SOLUTION SUBCUTANEOUS at 20:59

## 2020-06-10 RX ADMIN — INSULIN LISPRO SCH: 100 INJECTION, SOLUTION INTRAVENOUS; SUBCUTANEOUS at 11:51

## 2020-06-10 RX ADMIN — INSULIN LISPRO SCH: 100 INJECTION, SOLUTION INTRAVENOUS; SUBCUTANEOUS at 08:55

## 2020-06-10 RX ADMIN — INSULIN LISPRO SCH: 100 INJECTION, SOLUTION INTRAVENOUS; SUBCUTANEOUS at 17:04

## 2020-06-10 RX ADMIN — INSULIN GLARGINE SCH UNITS: 100 INJECTION, SOLUTION SUBCUTANEOUS at 08:55

## 2020-06-10 NOTE — PRG
DATE OF SERVICE:  06/10/2020



SUBJECTIVE:  Mr. Morrow is a well-developed, well-nourished 70-year-old white

male.  Unfortunately fell at home and had a resultant left bimalleolar ankle

fracture.  He was transferred to [facility], seen by Dr. Rush who felt

like it was nonsurgical case.  Placed in a posterior splint, and referred back to

[facility] for nonweightbearing, physical therapy, and occupational

therapy. 



The patient states he did well today and was able to walk 24 feet and 26 feet

nonweightbearing with his rolling walker.  His sugars have been good at 103 this

morning, 141 at lunch, and 113 at supper. 



His labs today were excellent, although he is slightly anemic.  Creatinine is 1.49.



OBJECTIVE:  VITAL SIGNS:  Today reveal blood pressure this evening is 116/69, pulse

83, respirations 18 to 20, O2 saturation 96% to 98% on room air, T-max 97.8. 

GENERAL:  This is a well-developed, well-nourished, large white male, in no apparent

distress at this time. 

HEENT:  Normocephalic and nontraumatic cranium.  Pupils are equally round and

reactive.  Extraocular movements are intact.  Nose and throat are slightly dry. 

NECK:  Supple without masses, nodes, or bruits. 

CHEST:  Clear to auscultation.  No cough, cold, rales, rhonchi, or wheezes are

heard. 

HEART:  Reveals regular rate and rhythm without murmurs, gallops, or rubs. 

ABDOMEN:  Soft and nontender without organomegaly.  Normal bowel sounds are noted in

all 4 quadrants.  No rebound or guarding is noted.  No CVA tenderness is noted. 

GENITOURINARY:  Deferred. 

EXTREMITIES:  Reveal no clubbing, cyanosis, or edema.  The patient has good

capillary refill and a lot less swelling in his left leg. 



ASSESSMENT:  

1. Diabetes type 2, still in good control.

2. Hypertension, stable.

3. Left lateral malleolar fracture, nonoperatively followed by Dr. Rush.

4. Diabetic neuropathy.

5. Diastolic congestive heart failure, stable.

6. Iron deficiency anemia.

7. Hyperlipidemia.

8. Human immunodeficiency virus.

9. Muscle spasm rather than muscle pain.

10. Generalized weakness.



PLAN:  

1. Continue cyclobenzaprine 10 mg t.i.d. only p.r.n. as needed.

2. Monitor the patient's diabetes with Accu-Cheks a.c. and at bedtime.

3. Encourage the patient to continue on low carb diet.

4. Continue to monitor the patient's blood pressure closely.

5. Monitor the patient's anemia.

6. Labs were done, which were actually very good.

7. Continue followup for signs and symptoms of congestive heart failure.

8. Continue strict nonweightbearing left lower extremity.

9. Follow up with Dr. Rush.

10. Continue supportive care.

11. Physical therapy and occupational therapy.







Job ID:  [id number]

## 2020-06-11 RX ADMIN — INSULIN GLARGINE SCH UNITS: 100 INJECTION, SOLUTION SUBCUTANEOUS at 08:54

## 2020-06-11 RX ADMIN — ASPIRIN SCH MG: 81 TABLET ORAL at 08:52

## 2020-06-11 RX ADMIN — INSULIN GLARGINE SCH UNITS: 100 INJECTION, SOLUTION SUBCUTANEOUS at 20:26

## 2020-06-11 RX ADMIN — INSULIN LISPRO SCH: 100 INJECTION, SOLUTION INTRAVENOUS; SUBCUTANEOUS at 17:05

## 2020-06-11 RX ADMIN — INSULIN LISPRO SCH: 100 INJECTION, SOLUTION INTRAVENOUS; SUBCUTANEOUS at 08:49

## 2020-06-11 RX ADMIN — INSULIN LISPRO SCH UNIT: 100 INJECTION, SOLUTION INTRAVENOUS; SUBCUTANEOUS at 12:10

## 2020-06-12 RX ADMIN — INSULIN GLARGINE SCH UNITS: 100 INJECTION, SOLUTION SUBCUTANEOUS at 20:40

## 2020-06-12 RX ADMIN — ASPIRIN SCH MG: 81 TABLET ORAL at 07:42

## 2020-06-12 RX ADMIN — INSULIN LISPRO SCH: 100 INJECTION, SOLUTION INTRAVENOUS; SUBCUTANEOUS at 11:47

## 2020-06-12 RX ADMIN — INSULIN LISPRO SCH: 100 INJECTION, SOLUTION INTRAVENOUS; SUBCUTANEOUS at 07:36

## 2020-06-12 RX ADMIN — INSULIN GLARGINE SCH UNITS: 100 INJECTION, SOLUTION SUBCUTANEOUS at 07:42

## 2020-06-12 RX ADMIN — INSULIN LISPRO SCH: 100 INJECTION, SOLUTION INTRAVENOUS; SUBCUTANEOUS at 16:52

## 2020-06-12 NOTE — PRG
DATE OF SERVICE:  06/12/2020



SUBJECTIVE:  Mr. Morrow is a well-developed, well-nourished, 70-year-old white

male.  Unfortunately while he was home, he fell with a resultant left bimalleolar

ankle fracture.  He was transferred to [facility], seen by Dr. Rush, and

determined to be a nonsurgical case.  Placed in a posterior splint, referred back to

[facility] for nonweightbearing status, physical therapy and

occupational therapy. 



He is doing very well.  He is not complaining of very much pain.  He was able to

walk 24 and 26 feet with a rolling walker and nonbearing on the left ankle.  His

sugars have actually been very good with a high yesterday 167, in the low to be 90.

His fasting sugar this morning is 110. 



OBJECTIVE:  VITAL SIGNS:  This morning reveal blood pressure 111/70, pulse 90,

respirations 18, O2 saturation 98% on room air. 

GENERAL:  This is a well-developed, well-nourished, large in tall white male, in no

apparent distress at this time. 

HEENT:  Normocephalic and nontraumatic cranium.  Pupils are equally round, reactive.

 Extraocular movements are intact.  Nose and throat are slightly dry. 

NECK:  Supple without masses, nodes, or bruits. 

CHEST:  Clear to auscultation.  No rales, rhonchi, or wheezes are heard. 

HEART:  Reveals a regular rate and rhythm without murmurs, gallops, or rubs. 

ABDOMEN:  Soft, obese, nontender without organomegaly.  Normal bowel sounds are

noted.  No rebound or guarding is noted. 

:  Deferred. 

EXTREMITIES:  Reveal no clubbing, cyanosis, or edema.  The patient has good

capillary refill and not a lot of swelling in his left leg at this time. 



ASSESSMENT:  

1. Diabetes, type 2 with good control.

2. Hypertension, stable.

3. Left lateral bimalleolar fracture, nonoperative, followed by Dr. Rush.

4. Diabetic neuropathy.

5. Diastolic congestive heart failure, stable.

6. Iron deficiency anemia, stable.

7. Hyperlipidemia.

8. Human immunodeficiency virus.

9. Muscle spasm.

10. Generalized weakness.



PLAN:  

1. Continue cyclobenzaprine at night for spasms as needed.

2. Continue to monitor the patient's diabetes with Accu-Cheks a.c. and at bedtime.

3. Continue to monitor the patient's blood pressure closely.

4. Continue to encourage the patient to eat low carb diet.

5. Monitor the patient's edema.

6. Continue to follow the patient closely for signs and symptoms of CHF.

7. Continue nonweightbearing status at this time until seen by followup with Dr. Rush. 

8. Continue supportive care.

9. Continue physical therapy and occupational therapy.







Job ID:  [id number]

## 2020-06-12 NOTE — PRG
DATE OF SERVICE:  06/11/2020



SUBJECTIVE:  Mr. Morrow is a very pleasant 70-year-old white male, who fell at

home with resultant bimalleolar fracture.  He was taken to [facility],

where Dr. Rush felt that his left bimalleolar fracture was nonsurgical.  Placed

in a posterior splint, referred back to [facility] for

nonweightbearing, physical therapy, and occupational therapy.  The patient states he

did well and was able to walk 15 feet, then 20 feet, then 16 feet nonweightbearing

in his rolling walker.  His sugars have been good with 90 this morning, 167 before

lunch, and 147 before supper. 



The patient states he is doing great and has no concerns or complaints.  He feels

like his ankle is much improved and does not hurt near as badly. 



OBJECTIVE:  VITAL SIGNS:  Today reveal blood pressure this morning 118/69, pulse 81

to 86, respirations 18, O2 saturation 98% to 99% on room air, and T-max 98.2. 

GENERAL:  This is a well-developed, well-nourished, rather large 70-year-old white

male, in no apparent distress at this time. 

HEENT:  Reveals normocephalic and nontraumatic cranium.  Pupils are equal, round,

and reactive.  Extraocular movements are intact.  Nose and throat are slightly dry. 

NECK:  Supple without masses, nodes, or bruits. 

CHEST:  Clear to auscultation.  No rales, rhonchi, or wheezes are heard. 

HEART:  Reveals a regular rate and rhythm without murmurs, gallops, or rubs. 

ABDOMEN:  Obese, soft, and nontender without organomegaly.  Normal bowel sounds are

noted.  No rebound or guarding is noted. 

:  Deferred. 

EXTREMITIES:  Reveal no clubbing, cyanosis, or edema.  The patient's left foot is in

a posterior splint and has good capillary refill in his toes and much less swelling

in the left leg. 



ASSESSMENT:  

1. Diabetes type 2, in good control.

2. Hypertension, stable.

3. Left bimalleolar fracture, nonoperative and followed by Dr. Rush.

4. Diabetic neuropathy.

5. Diastolic congestive heart failure.

6. Iron-deficiency anemia.

7. Hyperlipidemia.

8. Human immunodeficiency virus.

9. Muscle spasm.

10. Generalized weakness.



PLAN:  

1. Continue cyclobenzaprine 10 mg t.i.d. p.r.n. as needed for muscle spasms.

2. Continue to monitor the patient's diabetes with Accu-Cheks before meals and at

bedtime. 

3. Encourage the patient to continue to follow a low carb diet.

4. Continue to monitor the patient's blood pressure closely.

5. Monitor the patient's anemia.

6. Followup for signs and symptoms of congestive heart failure.

7. Continue strict nonweightbearing left lower extremity.

8. Follow up with Dr. Rush.

9. Continue supportive care.

10. Physical therapy and occupational therapy.







Job ID:  [id number]

## 2020-06-13 RX ADMIN — INSULIN GLARGINE SCH UNITS: 100 INJECTION, SOLUTION SUBCUTANEOUS at 09:37

## 2020-06-13 RX ADMIN — INSULIN LISPRO SCH: 100 INJECTION, SOLUTION INTRAVENOUS; SUBCUTANEOUS at 09:36

## 2020-06-13 RX ADMIN — INSULIN LISPRO SCH: 100 INJECTION, SOLUTION INTRAVENOUS; SUBCUTANEOUS at 16:35

## 2020-06-13 RX ADMIN — ASPIRIN SCH MG: 81 TABLET ORAL at 09:39

## 2020-06-13 RX ADMIN — INSULIN LISPRO SCH UNIT: 100 INJECTION, SOLUTION INTRAVENOUS; SUBCUTANEOUS at 12:10

## 2020-06-13 RX ADMIN — INSULIN GLARGINE SCH UNITS: 100 INJECTION, SOLUTION SUBCUTANEOUS at 20:03

## 2020-06-13 NOTE — PRG
DATE OF SERVICE:  06/13/2020



SUBJECTIVE:  Mr. Morrow is up in his chair.  He just finished lunch.  He is happy

with his progress.  He states that he is getting stronger.  Denies any concerns or

questions. 



OBJECTIVE:  VITAL SIGNS:  He is afebrile.  Heart rate 85, respirations 18, oxygen

saturation 96% on room air, blood pressure 112/63. 

CARDIOVASCULAR:  S1 and S2 plus. 

RESPIRATORY:  Normal vesicular breath sounds. 

ABDOMEN:  Soft and nontender.  Bowel sounds heard in all quadrants.  Ventral hernia

is present. 

EXTREMITIES:  Without cyanosis or clubbing. 

CENTRAL NERVOUS SYSTEM:  Improving deconditioning.



IMPRESSION:  

1. Left lateral malleolus fracture, on conservative management.

2. Diabetes mellitus, type 2.

3. Hypertension.

4. Dyslipidemia.

5. Chronic diastolic congestive heart failure.

6. Human immunodeficiency virus infection.

7. Ventral hernia.



PLAN:  

1. Continue current medications.

2. 1800-calorie heart-healthy ADA diet.

3. Accu-Cheks with sliding scale coverage.

4. DVT prophylaxis.

5. Decubitus precautions.

6. Stress ulcer prophylaxis.

7. Physical therapy.

8. Routine laboratory values.





Job ID:  [id number]

## 2020-06-14 RX ADMIN — INSULIN LISPRO SCH UNIT: 100 INJECTION, SOLUTION INTRAVENOUS; SUBCUTANEOUS at 12:03

## 2020-06-14 RX ADMIN — INSULIN LISPRO SCH: 100 INJECTION, SOLUTION INTRAVENOUS; SUBCUTANEOUS at 16:50

## 2020-06-14 RX ADMIN — INSULIN GLARGINE SCH UNITS: 100 INJECTION, SOLUTION SUBCUTANEOUS at 08:24

## 2020-06-14 RX ADMIN — ASPIRIN SCH MG: 81 TABLET ORAL at 08:19

## 2020-06-14 RX ADMIN — INSULIN LISPRO SCH: 100 INJECTION, SOLUTION INTRAVENOUS; SUBCUTANEOUS at 08:19

## 2020-06-14 RX ADMIN — INSULIN GLARGINE SCH UNITS: 100 INJECTION, SOLUTION SUBCUTANEOUS at 21:16

## 2020-06-14 NOTE — PRG
DATE OF SERVICE:  06/14/2020



SUBJECTIVE:  Mr. Morrow is up in his chair.  His sister is visiting.  He denies

any questions or concerns.  Happy with his progress.  All questions answered.  He

apparently has an appointment with orthopedic surgery on Wednesday. 



OBJECTIVE:  VITAL SIGNS:  He is afebrile, heart rate 91, respirations 18, oxygen

saturation 97% on room air, blood pressure 126/67. 

CARDIOVASCULAR SYSTEM:  S1 and S2 plus. 

RESPIRATORY SYSTEM:  Normal vesicular breath sounds. 

ABDOMEN:  Soft, obese, nontender.  Ventral hernia as well as small umbilical hernia

are present.  Bowel sounds heard in all quadrants. 

EXTREMITIES:  Without cyanosis or clubbing.  Left lower leg with soft splint/cast. 

CENTRAL NERVOUS SYSTEM:  Grossly nonfocal.



IMPRESSION:  

1. Left ankle fracture, on conservative management.

2. Ventral hernia and umbilical hernia.

3. Human immunodeficiency virus infection.

4. Diabetes mellitus, type 2.

5. Hypertension.

6. Dyslipidemia.



PLAN:  

1. Continue current medications.

2. 1800 calorie heart-healthy ADA diet.

3. Accu-Cheks with sliding scale coverage.

4. Orthopedic precautions.

5. DVT prophylaxis.

6. Decubitus precautions.

7. Physical therapy.

8. Routine laboratory values.

9. Dr. Carloz so.







Job ID:  [id number]

## 2020-06-15 RX ADMIN — INSULIN LISPRO SCH: 100 INJECTION, SOLUTION INTRAVENOUS; SUBCUTANEOUS at 17:40

## 2020-06-15 RX ADMIN — INSULIN LISPRO SCH UNIT: 100 INJECTION, SOLUTION INTRAVENOUS; SUBCUTANEOUS at 12:14

## 2020-06-15 RX ADMIN — INSULIN GLARGINE SCH UNITS: 100 INJECTION, SOLUTION SUBCUTANEOUS at 20:24

## 2020-06-15 RX ADMIN — INSULIN GLARGINE SCH UNITS: 100 INJECTION, SOLUTION SUBCUTANEOUS at 08:49

## 2020-06-15 RX ADMIN — INSULIN LISPRO SCH: 100 INJECTION, SOLUTION INTRAVENOUS; SUBCUTANEOUS at 08:49

## 2020-06-15 RX ADMIN — ASPIRIN SCH MG: 81 TABLET ORAL at 08:48

## 2020-06-15 NOTE — PRG
DATE OF SERVICE:  06/15/2020



SUBJECTIVE:  Mr. Morrow is a well-developed, rather large 70-year-old white male,

who fell at home.  He had a resultant left bimalleolar ankle fracture.  He was

transferred to [facility] and seen by Dr. Rush.  Dr. Rush felt this

is a nonsurgical case.  He was placed in a posterior splint and referred back to

[facility].  He is here for nonweightbearing status, but also for

physical therapy and occupational therapy and pain management. 



He has been doing very well.  He has had no concerns or complaints and has much less

pain.  He was supposed to see Dr. Rush again on Wednesday. 



LABORATORY DATA:  Laboratories reveal sugar this morning was 108; typically before

lunch, his sugars are always in the 200s or 190s secondary to eating a big

breakfast.  Prior to this, he never had that problem. 



PHYSICAL EXAMINATION:

VITAL SIGNS:  Today reveal blood pressure this morning 113/64, pulse 78,

respirations 18, O2 saturation 94% on room air, and T-max 97.8. 

GENERAL:  This is a well-developed, well-nourished, 6 feet 4, white male, in no

apparent distress at this time. 

HEENT:  Reveals normocephalic and nontraumatic cranium.  Pupils equally round and

reactive.  Extraocular movements are intact.  Nose and throat are slightly dry. 

NECK:  Supple without masses, nodes, or bruits. 

CHEST:  Clear to auscultation.  No rales, rhonchi, wheezes, or cough is heard. 

HEART:  Reveals a regular rate and rhythm without murmurs, rubs, or gallops. 

ABDOMEN:  Obese, soft, nontender without organomegaly.  Normal bowel sounds are

noted in all 4 quadrants.  No rebound or guarding was noted. 

:  Deferred. 

EXTREMITIES:  Reveal no clubbing, cyanosis, or edema.  The patient has good

capillary refill, not very much pain in his left ankle at all anymore.  No

significant swelling. 



ASSESSMENT:  

1. Hypertension, stable.

2. Diabetes, under good control.

3. Left lateral bimalleolar fracture, nonoperative followed by Dr. Rush.

4. Diabetic neuropathy.

5. Diastolic congestive heart failure, stable.

6. Iron deficiency anemia, stable.

7. Hyperlipidemia.

8. Human immunodeficiency virus.

9. Generalized weakness.

10. Muscle spasms.



PLAN:  

1. Continue present medications.

2. Appointment with Dr. Rush on Wednesday morning about 10 or 11 for followup.

3. Continue to monitor the patient's diabetes with Accu-Cheks a.c. and at bedtime.

4. Monitor the patient's blood pressure closely.

5. Encourage the patient to watch his carbs early in the morning of breakfast.

6. Monitor the patient for signs and symptoms of CHF.

7. Nonweightbearing still at this time until he sees Dr. Rush on Wednesday.

8. Supportive care.

9. Physical therapy and occupational therapy.







Job ID:  [id number]

## 2020-06-16 RX ADMIN — INSULIN LISPRO SCH UNIT: 100 INJECTION, SOLUTION INTRAVENOUS; SUBCUTANEOUS at 16:46

## 2020-06-16 RX ADMIN — INSULIN GLARGINE SCH UNITS: 100 INJECTION, SOLUTION SUBCUTANEOUS at 21:22

## 2020-06-16 RX ADMIN — ASPIRIN SCH MG: 81 TABLET ORAL at 08:29

## 2020-06-16 RX ADMIN — INSULIN LISPRO SCH UNIT: 100 INJECTION, SOLUTION INTRAVENOUS; SUBCUTANEOUS at 08:32

## 2020-06-16 RX ADMIN — INSULIN GLARGINE SCH UNITS: 100 INJECTION, SOLUTION SUBCUTANEOUS at 08:35

## 2020-06-16 RX ADMIN — INSULIN LISPRO SCH UNIT: 100 INJECTION, SOLUTION INTRAVENOUS; SUBCUTANEOUS at 12:05

## 2020-06-16 NOTE — PRG
DATE OF SERVICE:  06/16/2020



SUBJECTIVE:  Mr. Morrow is a very pleasant, well-developed 70-year-old white male,

who fell at home.  Unfortunately, he had a resultant left bimalleolar ankle

fracture.  He was transferred to [facility] and seen by Dr. Rush, who

felt that he was nonsurgical.  He was placed in a posterior splint, referred back to

[facility] for PT and OT.  He is actually still nonweightbearing,

beginning to transfer much better and get his balance with his rolling walker. 



The patient states he is doing well.  He is eating well and is eating too much

breakfast because his sugars are always high at lunch.  Other than that, he is

supposed to see Dr. Rush tomorrow for re-evaluation. 



OBJECTIVE:  VITAL SIGNS:  Today reveal blood pressure 134/81, pulse 96 to 98,

respirations 18 to 20, O2 saturation 98% to 96% on room air, and T-max 98.2. 

GENERAL:  This is a well-developed, well-nourished, very pleasant 70-year-old white

male, in no apparent distress at this time. 

HEENT:  Normocephalic and nontraumatic cranium.  The pupils are equal, round, and

reactive.  Extraocular movements are intact.  Nose and throat are slightly dry. 

NECK:  Supple without masses, nodes, or bruits. 

CHEST:  Clear to auscultation.  No rales, rhonchi, or wheezes are heard. 

HEART:  Reveals a regular rate and rhythm without murmurs, gallops, or rubs. 

ABDOMEN:  Soft, obese, and nontender without organomegaly.  Normal bowel sounds are

noted.  No rebound or guarding is noted. 

GENITOURINARY:  Deferred. 

EXTREMITIES:  Reveals no clubbing, cyanosis, or edema.  The patient is still in a

left posterior foot splint and he states "look I can move my ankle and my toes

without it hurting."  I still want him to not put any weight on that at all. 



ASSESSMENT:  

1. Hypertension, stable.

2. Diabetes, under fairly good control except for when he eats too much breakfast.

3. Left lateral bimalleolar fracture, nonoperative followed by Dr. Rush.

4. Diabetic neuropathy.

5. Diastolic congestive heart failure, stable.

6. Iron deficiency anemia, stable.

7. Hyperlipidemia.

8. Human immunodeficiency virus.

9. Generalized weakness.

10. Muscle spasms.



PLAN:  

1. Continue present medication.

2. The patient has appointment with Dr. Rush tomorrow morning about 10 or 11.

3. Continue to monitor the patient's diabetes with Accu-Cheks before meals and at

bedtime. 

4. Monitor the patient's blood pressure closely.

5. Encourage the patient to watch his carbs in the morning.

6. Monitor the patient for signs and symptoms of CHF.

7. Nonweightbearing still at this time and to see Dr. Rush on Wednesday.

8. Supportive care.

9. PT and OT.







Job ID:  [id number]

## 2020-06-17 LAB
ALBUMIN SERPL BCG-MCNC: 4.1 G/DL (ref 3.4–4.8)
ALP SERPL-CCNC: 105 U/L (ref 40–110)
ALT SERPL W P-5'-P-CCNC: 29 U/L (ref 8–55)
ANION GAP SERPL CALC-SCNC: 15 MMOL/L (ref 10–20)
ANISOCYTOSIS BLD QL SMEAR: (no result) (100X)
AST SERPL-CCNC: 22 U/L (ref 5–34)
BASOPHILS # BLD AUTO: 0.1 THOU/UL (ref 0–0.2)
BASOPHILS NFR BLD AUTO: 1.1 % (ref 0–1)
BILIRUB SERPL-MCNC: 0.3 MG/DL (ref 0.2–1.2)
BUN SERPL-MCNC: 26 MG/DL (ref 8.4–25.7)
CALCIUM SERPL-MCNC: 9.3 MG/DL (ref 7.8–10.44)
CHLORIDE SERPL-SCNC: 103 MMOL/L (ref 98–107)
CO2 SERPL-SCNC: 25 MMOL/L (ref 23–31)
CREAT CL PREDICTED SERPL C-G-VRATE: 82 ML/MIN (ref 70–130)
EOSINOPHIL # BLD AUTO: 0.2 THOU/UL (ref 0–0.7)
EOSINOPHIL NFR BLD AUTO: 3 % (ref 0–10)
GLOBULIN SER CALC-MCNC: 3.3 G/DL (ref 2.4–3.5)
GLUCOSE SERPL-MCNC: 102 MG/DL (ref 80–115)
HGB BLD-MCNC: 10.1 G/DL (ref 14–18)
LYMPHOCYTES # BLD AUTO: 1.3 THOU/UL (ref 1.2–3.4)
LYMPHOCYTES NFR BLD AUTO: 19 % (ref 21–51)
MCH RBC QN AUTO: 24.2 PG (ref 27–31)
MCV RBC AUTO: 84.7 FL (ref 78–98)
MDIFF COMPLETE?: YES
MONOCYTES # BLD AUTO: 0.5 THOU/UL (ref 0.11–0.59)
MONOCYTES NFR BLD AUTO: 7.6 % (ref 0–10)
NEUTROPHILS # BLD AUTO: 4.7 THOU/UL (ref 1.4–6.5)
NEUTROPHILS NFR BLD AUTO: 69.3 % (ref 42–75)
OVALOCYTES BLD QL SMEAR: (no result) (100X)
PLATELET # BLD AUTO: 141 THOU/UL (ref 130–400)
POTASSIUM SERPL-SCNC: 4.7 MMOL/L (ref 3.5–5.1)
RBC # BLD AUTO: 4.18 MILL/UL (ref 4.7–6.1)
SODIUM SERPL-SCNC: 138 MMOL/L (ref 136–145)
WBC # BLD AUTO: 6.8 THOU/UL (ref 4.8–10.8)

## 2020-06-17 RX ADMIN — INSULIN GLARGINE SCH UNITS: 100 INJECTION, SOLUTION SUBCUTANEOUS at 08:50

## 2020-06-17 RX ADMIN — INSULIN LISPRO SCH: 100 INJECTION, SOLUTION INTRAVENOUS; SUBCUTANEOUS at 16:36

## 2020-06-17 RX ADMIN — ASPIRIN SCH MG: 81 TABLET ORAL at 08:52

## 2020-06-17 RX ADMIN — INSULIN GLARGINE SCH UNITS: 100 INJECTION, SOLUTION SUBCUTANEOUS at 21:03

## 2020-06-17 RX ADMIN — INSULIN LISPRO SCH: 100 INJECTION, SOLUTION INTRAVENOUS; SUBCUTANEOUS at 09:35

## 2020-06-17 RX ADMIN — INSULIN LISPRO SCH: 100 INJECTION, SOLUTION INTRAVENOUS; SUBCUTANEOUS at 11:47

## 2020-06-17 NOTE — PRG
DATE OF SERVICE:  06/17/2020



SUBJECTIVE:  Mr. Morrow is a well-developed, well-nourished 70-year-old white

male, who unfortunately fell and had a resultant left bimalleolar fracture.  He was

transferred to [facility] where he was seen by Dr. Rush in consultation.

 Dr. Rush placed a posterior splint and felt that he was nonsurgical.  He was

transferred back to [facility] for PT and OT. 



He continues to be nonweightbearing at this time, but he is doing much better with

his transfers and walking with his rolling walker. 

The patient states he is supposed to see Dr. Rush today this morning sometime. 



Labs were done this morning and we will send a copy of his labs with him.  He has no

concerns or complaints today and he is very pleased that he has no significant pain

in his ankle when he moves it up and down or back and forth.  Splint is rather loose

because all of the swelling has gone down. 



PHYSICAL EXAMINATION:

VITAL SIGNS:  Today reveal blood pressure is not recorded this morning yet but last

night was 124/66, pulse 92, respirations 20, O2 saturation 96% on room air.  T-max

is 97.9. 

GENERAL:  This is a well-developed, well-nourished, rather large 70-year-old white

male, in no apparent distress at this time. 

HEENT:  Reveals normocephalic and nontraumatic cranium.  Pupils are equal, round,

and reactive.  Extraocular movements are intact.  Nose and throat are slightly dry. 

NECK:  Supple without masses, nodes, or bruits. 

CHEST:  Clear to auscultation.  No rales, rhonchi, wheezes are heard. 

HEART:  Reveals a regular rate and rhythm without murmurs, gallops or rubs. 

ABDOMEN:  Soft, obese, nontender.  Normal bowel sounds are noted.  No rebound or

guarding is noted. 

:  Exam is deferred. 

EXTREMITIES:  Reveal no clubbing, cyanosis, or edema.  The patient is sitting in his

wheelchair with left posterior splint and is pleased that he has no significant pain

in it.  He is not doing any weightbearing at all. 



ASSESSMENT:  

1. Status post left lateral bimalleolar fracture, nonoperative followed by Dr. Rush. 

2. Hypertension, stable.

3. Diabetes, fairly good control.

4. Diabetic neuropathy.

5. Diastolic congestive heart failure, stable.

6. Iron deficiency anemia.

7. Hyperlipidemia.

8. HIV.

9. Generalized weakness.

10. Muscle spasms.



PLAN:  

1. The patient has appointment with Dr. Rush today sometime between 10 and 11.

2. The patient will be evaluated by Dr. Rush and hopefully placed in a walking

boot or splint or something so he can at least have toe-touch. 

3. Continue to monitor the patient's diabetes, Accu-Cheks a.c. and at bedtime.

4. Monitor the patient's blood pressure closely.

5. Encourage the patient to watch his carbs in the morning.

6. Monitor the patient's signs and symptoms of CHF.

7. Nonweightbearing at this time until he sees Dr. Rush today.

8. We will follow Dr. Rush's directions as far as progression in his PT and OT.

9. Continue present medications.

10. Supportive care.

11. PT and OT.







Job ID:  [id number]

## 2020-06-18 RX ADMIN — INSULIN LISPRO SCH: 100 INJECTION, SOLUTION INTRAVENOUS; SUBCUTANEOUS at 09:00

## 2020-06-18 RX ADMIN — INSULIN LISPRO SCH UNIT: 100 INJECTION, SOLUTION INTRAVENOUS; SUBCUTANEOUS at 17:24

## 2020-06-18 RX ADMIN — INSULIN GLARGINE SCH UNITS: 100 INJECTION, SOLUTION SUBCUTANEOUS at 08:59

## 2020-06-18 RX ADMIN — ASPIRIN SCH: 81 TABLET ORAL at 09:01

## 2020-06-18 RX ADMIN — INSULIN GLARGINE SCH UNITS: 100 INJECTION, SOLUTION SUBCUTANEOUS at 21:05

## 2020-06-18 RX ADMIN — INSULIN LISPRO SCH UNIT: 100 INJECTION, SOLUTION INTRAVENOUS; SUBCUTANEOUS at 12:03

## 2020-06-18 NOTE — PRG
DATE OF SERVICE:  06/18/2020



SUBJECTIVE:  Mr. Morrow is a 70-year-old white male, who unfortunately fell at

home and had a resultant left bimalleolar fracture.  He was transferred to [facility], seen in consultation by Dr. Rush.  Dr. Rush placed posterior

splint, felt that he was nonsurgical.  He was transferred back to [facility] for physical therapy and occupational therapy. 



The patient has been nonweightbearing since the 26th and was seen by Dr. Rush

yesterday.  Dr. Rush felt that the patient would benefit from an open reduction

and internal fixation, so he scheduled him for tomorrow morning at the [facility].  The anesthesiologist reviewed his record and felt that he needed to be

surgerized at [facility] in case there were any problems and so he has been

scheduled for 06/24/2020 at [facility] for open reduction and internal

fixation of the bimalleolar fracture. 



OBJECTIVE:  VITAL SIGNS:  Today, reveal blood pressure 118/68, pulse 79,

respirations 16, O2 saturation 100% on room air, and T-max 97.4. 

GENERAL:  This is a well-developed, well-nourished, obese white male, in no apparent

distress at this time. 

HEENT:  Normocephalic and nontraumatic cranium.  Pupils are equal, round, and

reactive.  Extraocular movements are intact.  Nose and throat are slightly dry. 

NECK:  Supple without masses, nodes, or bruits. 

CHEST:  Clear to auscultation.  No rales, rhonchi, or wheezes are heard. 

HEART:  Reveals a regular rate and rhythm without murmurs, gallops, or rubs. 

ABDOMEN:  Obese, soft, and nontender without organomegaly.  Normal bowel sounds are

noted.  No rebound or guarding is noted.  No CVA tenderness is noted. 

GENITOURINARY:  Deferred. 

EXTREMITIES:  Reveal no clubbing, cyanosis, or edema.  The patient's left ankle is

now in a restrictive black boot.  He is still not to have any weightbearing at all. 



ASSESSMENT:  

1. Status post left lateral bimalleolar fracture, nonoperative followed by Dr. Rush. 

2. Hypertension, stable.

3. Diabetes, fairly good control.

4. Diabetic neuropathy.

5. Diastolic congestive heart failure, stable.

6. Iron deficiency anemia.

7. Hyperlipidemia.

8. Human immunodeficiency virus.

9. Generalized weakness.

10. Muscle spasms.



PLAN:  

1. The patient is going to be seen by Dr. Rush next Wednesday, which is

06/24/2020 for open reduction and internal fixation at [facility]. 

2. We will continue to monitor the patient's blood pressure closely and adjust

medications as needed until then. 

3. We will continue to monitor the patient's diabetes with Accu-Cheks before meals

and at bedtime. 

4. We will encourage the patient to watch his carbs every morning.

5. Continue to monitor the patient for signs and symptoms of CHF.

6. Nonweightbearing at this time until he sees Dr. Rush again on Wednesday.

7. Continue present medications.

8. Supportive care.

9. Physical therapy and occupational therapy.







Job ID:  [id number]

## 2020-06-19 RX ADMIN — INSULIN LISPRO SCH: 100 INJECTION, SOLUTION INTRAVENOUS; SUBCUTANEOUS at 08:23

## 2020-06-19 RX ADMIN — INSULIN LISPRO SCH UNIT: 100 INJECTION, SOLUTION INTRAVENOUS; SUBCUTANEOUS at 17:07

## 2020-06-19 RX ADMIN — INSULIN GLARGINE SCH UNITS: 100 INJECTION, SOLUTION SUBCUTANEOUS at 20:24

## 2020-06-19 RX ADMIN — ASPIRIN SCH MG: 81 TABLET ORAL at 08:16

## 2020-06-19 RX ADMIN — INSULIN GLARGINE SCH UNITS: 100 INJECTION, SOLUTION SUBCUTANEOUS at 08:17

## 2020-06-19 RX ADMIN — INSULIN LISPRO SCH UNIT: 100 INJECTION, SOLUTION INTRAVENOUS; SUBCUTANEOUS at 12:47

## 2020-06-19 NOTE — PRG
DATE OF SERVICE:  06/19/2020



SUBJECTIVE:  Mr. Morrow is a well-developed, well-nourished, very large

70-year-old white male, who unfortunately fell at home.  He had a resultant left

bimalleolar fracture and was transferred to [facility].  While there, he

was seen in consultation by Dr. Rush, who placed him in a posterior splint.  Dr. Rush felt that he was nonsurgical.  He was transferred to [facility] for PT and OT and nonweightbearing. 



The patient has been nonweightbearing since the 26th, was seen by Dr. Rush on

Wednesday, who felt that the patient would now benefit with open reduction and

internal fixation.  He is scheduled for surgery at [facility] next Wednesday.  Most

likely, he will be transferred either Tuesday night or Wednesday morning. 



The patient states he is doing well today, slept well and is eating well.  He has no

concerns or complaints. 



OBJECTIVE:  VITAL SIGNS:  Today reveal blood pressure 118/68, pulse 86, respirations

20, O2 saturation 98% to 100% on room air, and T-max 96.3. 

GENERAL:  This is a well-developed, very pleasant 70-year-old white male, in no

apparent distress at this time. 

HEENT:  Reveals normocephalic and nontraumatic cranium.  The pupils are equal,

round, and reactive.  Extraocular movements are intact.  Nose and throat are

slightly dry. 

NECK:  Supple without masses, nodes, or bruits. 

CHEST:  Clear to auscultation.  No rales, rhonchi, or wheezes are heard.  No cough

is noted. 

HEART:  Reveals a regular rate and rhythm without murmurs, gallops, or rubs. 

ABDOMEN:  Obese, soft, nontender without organomegaly.  Normal bowel sounds are

noted in all 4 quadrants.  No rebound or guarding is noted. 

GENITOURINARY:  Deferred. 

EXTREMITIES:  Reveal no clubbing, cyanosis, or edema.  The patient has his left

ankle in a black immobilization boot.  Apparently when they put him on, they did not

tell him that he could pop it up with air and mobilize his foot a lot better.  We

did that yesterday and he said it was much more comfortable.  He is still

nonweightbearing even with that boot on. 



ASSESSMENT:  

1. Left bimalleolar fracture followed by Dr. Rush now and scheduled for surgery

on Wednesday, which is 06/24/2020. 

2. Hypertension, stable.

3. Diabetes, fairly good control.

4. Diabetic neuropathy.

5. Diastolic congestive heart failure, stable.

6. Iron deficiency anemia.

7. Hyperlipidemia.

8. Human immunodeficiency virus.

9. Generalized weakness.

10. Muscle spasms.



PLAN:  

1. The patient is actually doing very well and stabilized.

2. The patient is going to be taken to the surgical suite on 06/24/2020 for open

reduction and internal fixation at [facility] by Dr. Rush. 

3. We will continue to monitor the patient's blood pressure closely and adjust

medications as needed. 

4. We will continue to monitor the patient's diabetes with Accu-Cheks a.c. and at

bedtime. 

5. We will continue to encourage the patient to watch his carbs, to keep his sugars

down. 

6. Continue to monitor the patient for signs and symptoms of CHF.

7. Nonweightbearing at this time until the patient has a surgery from Dr. Rush.

8. Continue present medications.

9. Supportive care.

10. Physical therapy and occupational therapy.





Job ID:  [id number]

## 2020-06-20 RX ADMIN — INSULIN GLARGINE SCH UNITS: 100 INJECTION, SOLUTION SUBCUTANEOUS at 21:01

## 2020-06-20 RX ADMIN — INSULIN GLARGINE SCH UNITS: 100 INJECTION, SOLUTION SUBCUTANEOUS at 08:44

## 2020-06-20 RX ADMIN — INSULIN LISPRO SCH UNIT: 100 INJECTION, SOLUTION INTRAVENOUS; SUBCUTANEOUS at 12:36

## 2020-06-20 RX ADMIN — INSULIN LISPRO SCH UNIT: 100 INJECTION, SOLUTION INTRAVENOUS; SUBCUTANEOUS at 16:59

## 2020-06-20 RX ADMIN — ASPIRIN SCH MG: 81 TABLET ORAL at 08:41

## 2020-06-20 RX ADMIN — INSULIN LISPRO SCH: 100 INJECTION, SOLUTION INTRAVENOUS; SUBCUTANEOUS at 08:51

## 2020-06-21 RX ADMIN — INSULIN GLARGINE SCH UNITS: 100 INJECTION, SOLUTION SUBCUTANEOUS at 08:25

## 2020-06-21 RX ADMIN — INSULIN LISPRO SCH UNIT: 100 INJECTION, SOLUTION INTRAVENOUS; SUBCUTANEOUS at 16:50

## 2020-06-21 RX ADMIN — INSULIN LISPRO SCH: 100 INJECTION, SOLUTION INTRAVENOUS; SUBCUTANEOUS at 08:25

## 2020-06-21 RX ADMIN — ASPIRIN SCH MG: 81 TABLET ORAL at 08:27

## 2020-06-21 RX ADMIN — INSULIN LISPRO SCH UNIT: 100 INJECTION, SOLUTION INTRAVENOUS; SUBCUTANEOUS at 12:03

## 2020-06-21 RX ADMIN — INSULIN GLARGINE SCH UNITS: 100 INJECTION, SOLUTION SUBCUTANEOUS at 21:01

## 2020-06-21 NOTE — PRG
DATE OF SERVICE:  06/21/2020



SUBJECTIVE:  The patient is lying in the bed, feels well, with no complaints of

shortness of breath or chest pain.  He is still weak, but he is ready to do more

therapy.  He is eating well. 



OBJECTIVE:  VITAL SIGNS:  His blood pressure is 143/66, temperature is 96, pulse is

83, respirations are 16, and O2 sats are 98% on room air. 

LUNGS:  Clear. 

CARDIAC:  Shows regular rhythm. 

EXTREMITIES:  Left ankle is in a brace.



ASSESSMENT:  

1. Stable left bimalleolar fracture, increasing strength, scheduled for surgery next

week. 

2. Hypertension, controlled to goal.

3. Diabetes, controlled to goal.

4. Diastolic heart failure, stable.



PLAN:  Continue PT/OT.  Dr. Carty to be back tomorrow.  Continue Accu-Cheks to

monitor and titrate and control diabetes.  The patient cited about surgical repair

of his bimalleolar fracture on the 24th. 







Job ID:  [id number]

## 2020-06-22 RX ADMIN — INSULIN LISPRO SCH UNIT: 100 INJECTION, SOLUTION INTRAVENOUS; SUBCUTANEOUS at 12:13

## 2020-06-22 RX ADMIN — INSULIN GLARGINE SCH UNITS: 100 INJECTION, SOLUTION SUBCUTANEOUS at 21:07

## 2020-06-22 RX ADMIN — ASPIRIN SCH MG: 81 TABLET ORAL at 08:18

## 2020-06-22 RX ADMIN — INSULIN GLARGINE SCH UNITS: 100 INJECTION, SOLUTION SUBCUTANEOUS at 08:13

## 2020-06-22 RX ADMIN — INSULIN LISPRO SCH UNIT: 100 INJECTION, SOLUTION INTRAVENOUS; SUBCUTANEOUS at 16:50

## 2020-06-22 RX ADMIN — INSULIN GLARGINE SCH UNITS: 100 INJECTION, SOLUTION SUBCUTANEOUS at 11:18

## 2020-06-22 RX ADMIN — INSULIN LISPRO SCH UNIT: 100 INJECTION, SOLUTION INTRAVENOUS; SUBCUTANEOUS at 08:10

## 2020-06-22 NOTE — PRG
DATE OF SERVICE:  06/22/2020



SUBJECTIVE:  Mr. Morrow is a rather large 70-year-old white male, who

unfortunately fell at home.  He had a resultant left bimalleolar fracture.  He was

transferred to [facility], where he was seen in consultation by Dr. Rush.  Dr. Rush placed a posterior splint and felt that it is nonsurgical.  He

was transferred to [facility] for physical therapy, occupational

therapy, weightbearing, and healing.  The patient was seen again by Dr. Rush last

week, and Dr. Tavarez felt that he would now be a candidate for open reduction and

internal fixation and he is scheduled at [facility] on Wednesday.  He has

already had his COVID test and we are ordering a chest x-ray, two-view and EKG. 



PHYSICAL EXAMINATION:

VITAL SIGNS:  Today reveal blood pressure 97/61, pulse 82, respirations 20, O2

saturation 93% to 95% on room air, and T-max 97.6.  Blood pressure later on today

was 134/78. 

GENERAL:  This is a well-developed, rather large, diabetic white male, in no

apparent distress at this time. 

HEENT:  Reveals normocephalic and nontraumatic cranium.  Pupils are equally round

and reactive.  Extraocular movements are intact.  Nose and throat are dry. 

NECK:  Supple without masses, nodes, or bruits. 

CHEST:  Clear to auscultation.  No rales, rhonchi, or wheezes are heard. 

HEART:  Reveals a regular rate and rhythm without murmurs, gallops, or rubs. 

ABDOMEN:  Obese, soft, nontender.  Normal bowel sounds noted in all 4 quadrants.  No

rebound or guarding is noted. 

:  Deferred. 

EXTREMITIES:  Reveal no clubbing, cyanosis, or edema.  The patient has his left

ankle in a black immobilization boot.  He states it feels good when he does not move

at all.  He is scheduled for surgery on Wednesday.  He is still nonweightbearing

with his boot on. 



ASSESSMENT:  

1. Left bimalleolar fracture, followed by Dr. Rush and now scheduled for surgery

on Wednesday, which is 06/24/2020. 

2. Hypertension.

3. Diabetes.

4. Diabetic neuropathy.

5. Diastolic congestive heart failure.

6. Iron-deficiency anemia.

7. Hyperlipidemia.

8. Human immunodeficiency virus.

9. Generalized weakness.

10. Muscle spasms.



PLAN:  

1. The patient is going to be taken to surgical suite on 06/24, for open reduction

and internal fixation at [facility] by Dr. Rush. 

2. Continue to monitor the patient's blood pressure closely and adjust medications

as needed. 

3. Continue to monitor the patient's diabetes with Accu-Cheks a.c. and at bedtime.

4. Continue to encourage the patient and watch his carb intake.

5. Nonweightbearing continues at this time even though the patient has a boot

immobilizer on. 

6. Continue present medications.

7. Supportive care.

8. PT and OT to be continued.







Job ID:  [id number]

## 2020-06-23 RX ADMIN — INSULIN LISPRO SCH UNIT: 100 INJECTION, SOLUTION INTRAVENOUS; SUBCUTANEOUS at 16:17

## 2020-06-23 RX ADMIN — INSULIN LISPRO SCH: 100 INJECTION, SOLUTION INTRAVENOUS; SUBCUTANEOUS at 08:34

## 2020-06-23 RX ADMIN — ASPIRIN SCH MG: 81 TABLET ORAL at 08:31

## 2020-06-23 RX ADMIN — INSULIN LISPRO SCH UNIT: 100 INJECTION, SOLUTION INTRAVENOUS; SUBCUTANEOUS at 12:05

## 2020-06-23 RX ADMIN — INSULIN GLARGINE SCH UNITS: 100 INJECTION, SOLUTION SUBCUTANEOUS at 08:24

## 2020-06-23 NOTE — RAD
EXAM:

CHEST TWO VIEWS



6/23/2020 3:42 PM



HISTORY:

Preop chest radiograph



COMPARISON:

July 5, 2016



FINDINGS:



Lungs:  No acute airspace consolidation.



Heart:  Mild cardiomegaly is stable.



Pulmonary Vessels: Normal.



Costophrenic Angles: Clear.



Pneumothorax: None.



Osseous Structures:  There is mild dextroscoliosis of the thoracic spine. There is scattered degenera
tive and osteoarthritic change present.



Additional Findings:   None.



IMPRESSION:

No significant acute intrathoracic disease.



Reported By: Truong Sandhu 

Electronically Signed:  6/23/2020 3:43 PM

## 2020-06-23 NOTE — PRG
DATE OF SERVICE:  06/23/2020



SUBJECTIVE:  Mr. Morrow is a well-developed, well-nourished 70-year-old white

male, who fell at home.  He had a resultant left bimalleolar ankle fracture and was

transferred to [facility], where he was seen in consultation by Dr. Rush.  Dr. Rush evaluated him and felt that he was nonsurgical, so he placed a

posterior splint on him.  He was transferred to [facility] for PT

and OT.  He was nonweightbearing.  The patient was seen again by Dr. Rush last

Thursday or Friday, and Dr. Rush felt that the patient now was a candidate for an

open reduction and internal fixation.  He is scheduled to have surgery on Wednesday

afternoon.  He has had his COVID test, which is negative.  He had EKG last night and

he needs a chest x-ray prior to surgery.  To be transferred most likely tomorrow

morning, because the surgery would be in the afternoon, he can get his chest x-ray

at that time. 



The patient states he is doing well.  He is a little nervous about surgery, but

feels like this is what he needs to do.  His sister Malia is in the room also, who I

did answer all the questions this morning. 



OBJECTIVE:  VITAL SIGNS:  Today reveal blood pressure this morning 116/65, pulse 81

to 84, respirations 18 to 20, O2 saturation 93% to 98% on room air, and T-max 97.6. 

GENERAL:  This is a well-developed, well-nourished, pleasant 70-year-old white male,

in no apparent distress at this time. 

HEENT:  Normocephalic and nontraumatic cranium.  Pupils are equally round and

reactive.  Extraocular movements intact.  Nose and throat are slightly dry, but

clear. 

NECK:  Supple without masses, nodes, or bruits. 

CHEST:  Clear to auscultation.  No rales, rhonchi, wheezes, or cough is heard. 

HEART:  Reveals a regular rate and rhythm without murmurs, gallops, or rubs. 

ABDOMEN:  Obese, soft, and nontender.  Bowel sounds are found in all 4 quadrants.

No rebound or guarding is noted. 

GENITOURINARY:  Deferred. 

EXTREMITIES:  Reveal no clubbing, cyanosis, or edema.  The patient does have a left

plaque immobilization boot on his left ankle.  It is there and he wears it at all

times.  He is scheduled for surgery tomorrow afternoon.  We will most likely

transfer him tomorrow morning to [facility] for that surgery.  He is still

nonweightbearing even with the boot on. 



LABORATORY DATA:  Sugars today reveal blood sugar yesterday morning 195 fasting, 279

before lunch, 207 before supper, 157 before bedtime.  Fasting this morning is 148,

which is much better. 



I did address the patient eating so much food and so many carbs and he said he will

try to cut down. 



Labs will be done tomorrow morning prior to surgery also.



ASSESSMENT:  

1. Left bimalleolar fracture followed by Dr. Rush's schedule for surgery on

Wednesday afternoon, which is 06/24/2020. 

2. Hypertension, stable.

3. Diabetes, stable.

4. Diabetic neuropathy.

5. Diastolic congestive heart failure by history.

6. Iron deficiency anemia.

7. Hyperlipidemia.

8. Human immunodeficiency virus.

9. Generalized weakness.

10. Muscle spasm.



PLAN:  

1. The patient will be transferred to [facility] in [place] tomorrow morning

for surgery tomorrow afternoon. 

2. The patient is scheduled for an open reduction and internal fixation done by Dr. Rush for his bimalleolar fracture. 

3. We will continue to monitor the patient's blood pressure closely and adjust

medications as needed. 

4. We will continue to monitor the patient's diabetes with Accu-Cheks before meals

and at bedtime. 

5. We will continue to encourage the patient to watch his carb intake.

6. The patient will continue to be nonweightbearing.

7. Continue present medications.

8. Supportive care.

9. Physical therapy and occupational therapy to be continued.

10. We expect the patient back tomorrow after surgery.







Job ID:  [id number]

## 2020-06-24 ENCOUNTER — HOSPITAL ENCOUNTER (OUTPATIENT)
Dept: HOSPITAL 92 - SDC | Age: 70
Discharge: NURSING, REHAB OR LONG-TERM CARE | End: 2020-06-24
Attending: ORTHOPAEDIC SURGERY
Payer: MEDICARE

## 2020-06-24 VITALS — BODY MASS INDEX: 33.7 KG/M2

## 2020-06-24 DIAGNOSIS — F41.9: ICD-10-CM

## 2020-06-24 DIAGNOSIS — K74.60: ICD-10-CM

## 2020-06-24 DIAGNOSIS — S82.62XA: Primary | ICD-10-CM

## 2020-06-24 DIAGNOSIS — Z79.82: ICD-10-CM

## 2020-06-24 DIAGNOSIS — F32.9: ICD-10-CM

## 2020-06-24 DIAGNOSIS — Z79.899: ICD-10-CM

## 2020-06-24 DIAGNOSIS — Z79.4: ICD-10-CM

## 2020-06-24 DIAGNOSIS — Z88.8: ICD-10-CM

## 2020-06-24 PROCEDURE — 73610 X-RAY EXAM OF ANKLE: CPT

## 2020-06-24 PROCEDURE — 76000 FLUOROSCOPY <1 HR PHYS/QHP: CPT

## 2020-06-24 PROCEDURE — 27792 TREATMENT OF ANKLE FRACTURE: CPT

## 2020-06-24 PROCEDURE — C1713 ANCHOR/SCREW BN/BN,TIS/BN: HCPCS

## 2020-06-24 PROCEDURE — 82962 GLUCOSE BLOOD TEST: CPT

## 2020-06-24 PROCEDURE — 0QSK04Z REPOSITION LEFT FIBULA WITH INTERNAL FIXATION DEVICE, OPEN APPROACH: ICD-10-PCS | Performed by: ORTHOPAEDIC SURGERY

## 2020-06-24 PROCEDURE — 36416 COLLJ CAPILLARY BLOOD SPEC: CPT

## 2020-06-24 RX ADMIN — INSULIN GLARGINE SCH UNITS: 100 INJECTION, SOLUTION SUBCUTANEOUS at 22:00

## 2020-06-24 NOTE — PRG
DATE OF SERVICE:  06/24/2020



SUBJECTIVE:  Mr. Morrow is a 70-year-old white male, who fell at home.

Unfortunately, he had a resultant left bimalleolar ankle fracture and was

transferred to [facility].  He was seen in consultation by Dr. Rush, who

felt that he was nonsurgical at that time.  He placed him on a posterior splint and

transferred him to [facility].  He was here for PT, OT, transfer

instructions, and nonweightbearing.  He was seen by Dr. Rush again last week, and

Dr. Rush thought he is now a candidate for open reduction and internal fixation.

He is scheduled for surgery this afternoon.  He is being transferred to [facility] in [place] around noon today. 



His COVID test is negative.  EKG was done last night, but I cannot find the results,

we will send it with him.  Chest x-ray is unremarkable. 



The patient states he is ready to go and get this leg fixed.



PHYSICAL EXAMINATION:

VITAL SIGNS:  Today reveal blood pressure of 116/65, pulse 89, respirations 20, O2

saturation 95% on room air, and T-max 98.4. 

GENERAL:  This is a well-developed, well-nourished, 70-year-old white male, in no

apparent distress at this time. 

HEENT:  Reveals normocephalic and nontraumatic cranium.  Pupils are equal, round,

and reactive.  Extraocular movements are intact.  Nose and throat are slightly dry,

but clear. 

NECK:  Supple without masses, nodes, or bruits. 

CHEST:  Clear to auscultation.  No rales, rhonchi, wheezes, or cough is heard. 

HEART:  Reveals a regular rate and rhythm without murmurs, gallops, or rubs. 

ABDOMEN:  Soft, obese, nontender.  Normal bowel sounds in all 4 quadrants.  No

rebound or guarding is noted. 

:  Deferred. 

EXTREMITIES:  Reveal no clubbing, cyanosis, or edema.  The patient's left ankle has

quite a bit of what looks like probable cartilage forming on the inside ankle

medially, but no significant tenderness.  The patient is scheduled for surgery this

afternoon on that ankle. 



ASSESSMENT:  

1. Bimalleolar fracture, followed by Dr. Rush.

2. Scheduled for surgery this afternoon at [facility] in [place], which is

thought to be outpatient and he should return this afternoon. 

3. Hypertension, stable.

4. Diabetes, stable.

5. Diabetic neuropathy.

6. Diastolic congestive heart failure by history.

7. Iron-deficiency anemia.

8. Hyperlipidemia.

9. Human immunodeficiency virus.

10. Generalized weakness.

11. Muscle spasm.



PLAN:  

1. The patient will be transferred to [facility] this morning.

2. The patient is n.p.o. since 6 o'clock this morning.

3. Continue to monitor the patient's blood pressure closely.

4. Continue to monitor the patient's diabetes with Accu-Cheks closely.

5. Continue to encourage the patient to watch his carb intake.

6. The patient is n.p.o. at this time.

7. The patient is non-bearing on that ankle at this time.

8. Continue present medications.

9. Supportive care.

10. Transfer the patient to [facility] this morning.







Job ID:  [id number]

## 2020-06-24 NOTE — RAD
Intraoperative C-arm evaluation of the left ankle (3 total images submitted)



INDICATION: History of left ankle fracture; open reduction internal fixation



Comparison prior exam dated May 21, 2020



FINDINGS: Since the comparison examination there is been interval open reduction internal fixation of
 the lateral malleolus fracture. Fracture alignment is near anatomic. Instrumentation projects in

the expected position without gross evidence of complication. Total fluoroscopic time is 15 seconds.



IMPRESSION: ORIF of lateral malleolus fracture seen on the comparison exam.



Reported By: Truong Sandhu 

Electronically Signed:  6/24/2020 4:31 PM

## 2020-06-24 NOTE — OP
DATE OF PROCEDURE:  06/24/2020



PREOPERATIVE DIAGNOSES:  Fracture of the lateral malleolus and disruption of the

deltoid ligament with lateral subluxation of the left ankle. 



POSTOPERATIVE DIAGNOSES:  Fracture of the lateral malleolus and disruption of the

deltoid ligament with lateral subluxation of the left ankle. 



PROCEDURES PERFORMED:  Open reduction and internal fixation of the lateral malleolus

and repair of the deltoid ligament with reduction of the left ankle. 



ANESTHESIA:  General.



DESCRIPTION OF PROCEDURE:  The patient was given preoperative IV antibiotics, taken

to the operating room, placed in supine position.  Satisfactory general anesthesia

was performed.  The left lower extremity was sterilely prepped and draped in usual

fashion.  After exsanguination, tourniquet was raised to 250 mmHg.  A longitudinal

incision was made over the lateral aspect of the ankle over the distal fibula.

Blunt and sharp dissection was made down to the distal fibula.  The oblique fracture

was identified and after some callus that had formed was removed as well as some

scar tissue, I was able to reduce the fracture, and then internal fixation was

provided using a 5-hole Synthes distal fibular plate.  Two of the 3.5 cortical

screws were placed into the shaft and then five 2.7 locking screws were placed

distally along with three additional 2.7 locking screws placed proximally into the

shaft.  This was all performed under fluoroscopic visualization and showed anatomic

reduction of the distal fibular fracture with proper placement of the plate and

screws.  The medial aspect of the ankle was opened and the deltoid ligament was

noted to be completely disrupted, scarred down.  It was removed from the medial

aspect of the ankle joint.  The scar tissue that had formed in the ankle was

debrided with a curette and rongeur, and then initially attempt was made to repair

the deltoid ligament back to the medial aspect of the talus using an Arthrex metal

anchor, but on attempts of repairing the ligament down to the bone, the bone was not

strong enough to hold the anchor and it pulled out.  Therefore, the #2 FiberWire

suture was used to repair the remaining portion of the tendon on the talus to the

rest of the deltoid ligament, and this provided good repair of the deltoid ligament

and good position of the ankle joint.  Both wounds were then copiously irrigated

with antibiotic solution.  They were closed using 0 Vicryl for the periosteum and

fascia over the lateral plate and screws.  The fat and subcutaneous tissue were

closed with 0 Vicryl, and the skin was closed with skin staples.  Sterile dressing

was applied along with a boot.  Tourniquet was released.  The patient was awakened,

extubated, and transferred to recovery room in stable condition. 



ESTIMATED BLOOD LOSS:  Minimal.



COMPLICATIONS:  None.



TOURNIQUET TIME:  54 minutes.







Job ID:  [id number]

## 2020-06-25 RX ADMIN — INSULIN LISPRO SCH: 100 INJECTION, SOLUTION INTRAVENOUS; SUBCUTANEOUS at 11:13

## 2020-06-25 RX ADMIN — INSULIN LISPRO PRN UNIT: 100 INJECTION, SOLUTION INTRAVENOUS; SUBCUTANEOUS at 16:19

## 2020-06-25 RX ADMIN — INSULIN LISPRO SCH UNIT: 100 INJECTION, SOLUTION INTRAVENOUS; SUBCUTANEOUS at 08:28

## 2020-06-25 RX ADMIN — INSULIN GLARGINE SCH UNITS: 100 INJECTION, SOLUTION SUBCUTANEOUS at 20:32

## 2020-06-25 RX ADMIN — ASPIRIN SCH MG: 81 TABLET ORAL at 08:21

## 2020-06-25 RX ADMIN — INSULIN GLARGINE SCH UNITS: 100 INJECTION, SOLUTION SUBCUTANEOUS at 09:20

## 2020-06-25 NOTE — PRG
DATE OF SERVICE:  06/25/2020



SUBJECTIVE:  Mr. Morrow is a 70-year-old white male, who fell at home.

Unfortunately, he had a bimalleolar fracture and was transferred to [facility] and saw Dr. Rush.  Dr. Rush thought it was nonsurgical and 
placed him

on a posterior splint and transferred him to [facility] for PT 
and

OT. 



The patient was seen by Dr. Rush last week, and he felt that the patient now

really did need to have open reduction and internal fixation, so he took him to 
the

surgical suite yesterday and did a plate with 10 screws.  Postoperatively, the

patient was transferred back to [facility] for pain management,

physical therapy, and occupational therapy.  He is still nonweightbearing as

tolerated. 



OBJECTIVE:  VITAL SIGNS:  Today reveal blood pressure 134/68, pulse 106,

respirations 18, O2 saturation 96% on room air, T-max 98.1. 

GENERAL:  This is a well-developed, well-nourished, obese, 70-year-old, white 
male,

in no apparent distress at this time. 

HEENT:  Reveal normocephalic and nontraumatic cranium.  Pupils are equally 
round and

reactive.  Extraocular movements are intact.  Nose and throat are slightly dry. 

NECK:  Supple without masses, nodes, or bruits. 

CHEST:  Clear to auscultation.  No rales, rhonchi, wheezes, or cough is heard. 

HEART:  Reveals a regular rate and rhythm without murmurs, gallops, or rubs. 

ABDOMEN:  Obese, soft, and nontender.  Normal bowel sounds noted in all 4 
quadrants.

 No rebound or guarding is noted. 

:  Deferred. 

EXTREMITIES:  Reveal no clubbing or cyanosis.  The left foot is in a black

immobilization boot.  The patient had surgery yesterday and returned to [facility] about 0430. 



ASSESSMENT:  

1. Bimalleolar fracture, status post open reduction and internal fixation by 
Dr. Rush. 

2. Hypertension, stable.

3. Diabetes, slightly elevated.

4. Diabetic neuropathy.

5. Diastolic congestive heart failure by history.

6. Hyperlipidemia.

7. Human immunodeficiency virus.

8. Generalized weakness.

9. Muscle spasm.



PLAN:  

1. Continue to encourage the patient to wear his boot at all times.

2. Continue to monitor the patient's blood pressure closely.

3. Continue to monitor the patient's diabetes with Accu-Cheks a.c. and at 
bedtime.

4. Encourage the patient to watch his carbohydrates and keep them low.

5. The patient is still nonweightbearing on his ankle.

6. Continue present medications.

7. Supportive care.

8. Continue physical therapy and occupational therapy.







Job ID:  [id number]



MTDD

## 2020-06-26 RX ADMIN — INSULIN LISPRO PRN UNIT: 100 INJECTION, SOLUTION INTRAVENOUS; SUBCUTANEOUS at 05:43

## 2020-06-26 RX ADMIN — INSULIN LISPRO PRN UNIT: 100 INJECTION, SOLUTION INTRAVENOUS; SUBCUTANEOUS at 12:12

## 2020-06-26 RX ADMIN — INSULIN LISPRO PRN UNIT: 100 INJECTION, SOLUTION INTRAVENOUS; SUBCUTANEOUS at 17:22

## 2020-06-26 RX ADMIN — INSULIN GLARGINE SCH UNITS: 100 INJECTION, SOLUTION SUBCUTANEOUS at 09:00

## 2020-06-26 RX ADMIN — INSULIN GLARGINE SCH UNITS: 100 INJECTION, SOLUTION SUBCUTANEOUS at 20:28

## 2020-06-26 RX ADMIN — ASPIRIN SCH MG: 81 TABLET ORAL at 08:55

## 2020-06-26 NOTE — PRG
DATE OF SERVICE:  06/26/2020



SUBJECTIVE:  Mr. Morrow is a 70-year-old white male, who fell at home and had 
a

resultant bimalleolar fracture.  He was transferred to [facility] 
where he

was seen by Dr. Rush.  Dr. Rush pronounced that he was nonsurgical at that

time and put him in a posterior splint.  He was transferred to [facility] for PT and OT. 



The patient saw Dr. Rush this past Wednesday, at which time, he did open

reduction and internal fixation.  That required a plate with 10 screws.

Postoperatively, the patient was transferred back to [facility]

because of outpatient surgery.  He is here for pain management, physical therapy
,

and occupational therapy.  He is still presently nonweightbearing and has an

immobilization boot on. 



The patient states he has had no more pain and is not taking any pain medicine 
since

yesterday morning. 



OBJECTIVE:  VITAL SIGNS:  This morning reveal blood pressure 135/76, pulse 96 
to 98,

respirations 18, O2 saturation 96% to 97% on room air, T-max 98.4. 

GENERAL:  This is a well-developed, well-nourished, obese white male, in no 
apparent

distress at this time. 

HEENT:  Reveals normocephalic and nontraumatic cranium.  Pupils are equally 
round

and reactive.  Extraocular movements are intact.  Nose and throat are clear. 

NECK:  Supple without masses, nodes, or bruits. 

CHEST:  Clear to auscultation.  No rales, rhonchi, wheezes, or cough is heard. 

HEART:  Reveals a regular rate and rhythm without murmurs, gallops, or rubs. 

ABDOMEN:  Obese, soft, and nontender.  Normal bowel sounds are noted in all 4

quadrants.  No rebound or guarding is noted. 

:  Deferred. 

EXTREMITIES:  Reveal no clubbing or cyanosis.  Left foot is in an immobilization

boot.  The patient had surgery on Wednesday, which was 06/24/2020.  He is doing 
well

and has no more complaints of pain. 



ASSESSMENT:  

1. Bimalleolar fracture, status post open reduction and internal fixation by Dr. Rush on 06/24/2020. 

2. Hypertension, stable.

3. Diabetes, slightly elevated since surgery, but improving.

4. Diabetic neuropathy.

5. Diastolic congestive heart failure by history.

6. Hyperlipidemia.

7. Human immunodeficiency virus.

8. Generalized weakness.

9. Muscle spasms, which are improved.



PLAN:  

1. Continue to monitor the patient's blood pressures closely.  Adjust 
medications if

needed. 

2. Monitor the patient's diabetes with Accu-Cheks a.c. and at bedtime.

3. Encourage the patient to not eat many carbohydrates.

4. Encourage the patient to continue wear his immobilization boot pretty much 
at all

times. 

5. Nonweightbearing on his ankle.

6. Continue present medications.

7. Supportive care.

8. Continue physical therapy and occupational therapy.







Job ID:  [id number]



[facility]

## 2020-06-27 LAB
ALBUMIN SERPL BCG-MCNC: 3.6 G/DL (ref 3.4–4.8)
ALP SERPL-CCNC: 106 U/L (ref 40–110)
ALT SERPL W P-5'-P-CCNC: 42 U/L (ref 8–55)
ANION GAP SERPL CALC-SCNC: 15 MMOL/L (ref 10–20)
AST SERPL-CCNC: 40 U/L (ref 5–34)
BASOPHILS # BLD AUTO: 0 THOU/UL (ref 0–0.2)
BASOPHILS NFR BLD AUTO: 0.4 % (ref 0–1)
BILIRUB SERPL-MCNC: 0.3 MG/DL (ref 0.2–1.2)
BUN SERPL-MCNC: 34 MG/DL (ref 8.4–25.7)
CALCIUM SERPL-MCNC: 9.3 MG/DL (ref 7.8–10.44)
CHLORIDE SERPL-SCNC: 105 MMOL/L (ref 98–107)
CO2 SERPL-SCNC: 24 MMOL/L (ref 23–31)
CREAT CL PREDICTED SERPL C-G-VRATE: 93 ML/MIN (ref 70–130)
EOSINOPHIL # BLD AUTO: 0.1 THOU/UL (ref 0–0.7)
EOSINOPHIL NFR BLD AUTO: 0.9 % (ref 0–10)
GLOBULIN SER CALC-MCNC: 3.2 G/DL (ref 2.4–3.5)
GLUCOSE SERPL-MCNC: 130 MG/DL (ref 80–115)
HGB BLD-MCNC: 9.3 G/DL (ref 14–18)
LYMPHOCYTES NFR BLD AUTO: 15.3 % (ref 21–51)
MCH RBC QN AUTO: 24 PG (ref 27–31)
MCV RBC AUTO: 82.3 FL (ref 78–98)
MDIFF COMPLETE?: YES
MICROCYTES BLD QL SMEAR: (no result) (100X)
MONOCYTES # BLD AUTO: 0.5 THOU/UL (ref 0.11–0.59)
MONOCYTES NFR BLD AUTO: 6 % (ref 0–10)
NEUTROPHILS # BLD AUTO: 6.3 THOU/UL (ref 1.4–6.5)
NEUTROPHILS NFR BLD AUTO: 77.3 % (ref 42–75)
PLATELET # BLD AUTO: 101 THOU/UL (ref 130–400)
POTASSIUM SERPL-SCNC: 4.5 MMOL/L (ref 3.5–5.1)
RBC # BLD AUTO: 3.88 MILL/UL (ref 4.7–6.1)
SODIUM SERPL-SCNC: 139 MMOL/L (ref 136–145)
WBC # BLD AUTO: 8.2 THOU/UL (ref 4.8–10.8)

## 2020-06-27 RX ADMIN — INSULIN GLARGINE SCH UNITS: 100 INJECTION, SOLUTION SUBCUTANEOUS at 20:38

## 2020-06-27 RX ADMIN — INSULIN GLARGINE SCH UNITS: 100 INJECTION, SOLUTION SUBCUTANEOUS at 09:13

## 2020-06-27 RX ADMIN — INSULIN LISPRO PRN UNIT: 100 INJECTION, SOLUTION INTRAVENOUS; SUBCUTANEOUS at 15:08

## 2020-06-27 RX ADMIN — INSULIN LISPRO PRN UNIT: 100 INJECTION, SOLUTION INTRAVENOUS; SUBCUTANEOUS at 17:41

## 2020-06-27 RX ADMIN — ASPIRIN SCH MG: 81 TABLET ORAL at 09:11

## 2020-06-27 NOTE — PRG
DATE OF SERVICE:  



SUBJECTIVE:  Mr. Morrow is up in his chair.  He has a walking boot onto his left

leg.  He states that his pain is controlled.  He denies any questions or concerns. 



OBJECTIVE:  VITAL SIGNS:  He is afebrile.  Heart rate is 77, respirations 20, oxygen

saturation 97% on room air, blood pressure 133/70. 

CARDIOVASCULAR:  S1-S2 plus. 

RESPIRATORY:  Normal vesicular breath sounds. 

ABDOMEN:  Soft, nontender.  Bowel sounds heard in all quadrants. 

EXTREMITIES:  Without cyanosis or clubbing.  Left leg in a walking boot. 

CENTRAL NERVOUS SYSTEM:  Improving deconditioning.



IMPRESSION:  

1. Left ankle fracture, status post surgical fixation.

2. Hypertension.

3. Dyslipidemia.

4. Cirrhosis.

5. Human immunodeficiency virus infection.

6. Ventral hernia and umbilical hernia.



PLAN:  

1. Continue current medications.

2. 1800 calorie heart healthy ADA diet.

3. Accu-Cheks with sliding scale coverage.

4. DVT prophylaxis.

5. Decubitus precautions.

6. Stress ulcer prophylaxis.

7. Physical therapy.

8. Routine laboratory values.







Job ID:  [id number]

## 2020-06-28 RX ADMIN — INSULIN GLARGINE SCH UNITS: 100 INJECTION, SOLUTION SUBCUTANEOUS at 08:47

## 2020-06-28 RX ADMIN — INSULIN GLARGINE SCH UNITS: 100 INJECTION, SOLUTION SUBCUTANEOUS at 20:29

## 2020-06-28 RX ADMIN — ASPIRIN SCH MG: 81 TABLET ORAL at 08:47

## 2020-06-28 RX ADMIN — INSULIN LISPRO PRN UNIT: 100 INJECTION, SOLUTION INTRAVENOUS; SUBCUTANEOUS at 21:51

## 2020-06-28 NOTE — PRG
DATE OF SERVICE:  06/28/2020



SUBJECTIVE:  Mr. Morrow is doing well.  He is sleeping, but arousable.  He is

compliant with his walking boot. 



OBJECTIVE:  VITAL SIGNS:  He is afebrile, heart rate 96, respirations 16, oxygen

saturation 98% on room air, blood pressure 116/58. 

CARDIOVASCULAR SYSTEM:  S1-S2 plus. 

RESPIRATORY SYSTEM:  Normal vesicular breath sounds. 

ABDOMEN:  Soft, nontender.  Bowel sounds heard in all quadrants. 

EXTREMITIES:  Without cyanosis or clubbing, and walking boot in his left leg. 

CENTRAL NERVOUS SYSTEM:  Improving deconditioning.



IMPRESSION:  

1. Left ankle fracture, status post surgical fixation.

2. Cirrhosis.

3. HIV infection.

4. Hypertension.

5. Dyslipidemia.

6. Ventral hernia.

7. Umbilical hernia.



PLAN:  

1. Continue current medications.

2. Heart healthy diet.

3. DVT prophylaxis.

4. Decubitus precautions.

5. Stress ulcer prophylaxis.

6. Routine laboratory values.

7. Orthopedic precautions.

[facility]





Job ID:  [id number]

## 2020-06-29 RX ADMIN — ASPIRIN SCH MG: 81 TABLET ORAL at 09:04

## 2020-06-29 RX ADMIN — INSULIN LISPRO PRN UNIT: 100 INJECTION, SOLUTION INTRAVENOUS; SUBCUTANEOUS at 12:36

## 2020-06-29 RX ADMIN — INSULIN GLARGINE SCH UNITS: 100 INJECTION, SOLUTION SUBCUTANEOUS at 20:27

## 2020-06-29 RX ADMIN — INSULIN GLARGINE SCH UNITS: 100 INJECTION, SOLUTION SUBCUTANEOUS at 09:04

## 2020-06-29 NOTE — PRG
DATE OF SERVICE:  06/29/2020



SUBJECTIVE:  Mr. Morrow is a well-developed, well-nourished, very pleasant

70-year-old white male, who unfortunately fell at home.  He had a resultant

bimalleolar fracture of his left ankle.  Initially, it was nonsurgical, and Dr. Rush put him in a posterior splint.  He was at [facility], and

Dr. Rush saw him a week ago for re-evaluation, at which time he took him to

surgical suite on Wednesday and did open reduction and internal fixation.  It

required a plate with 10 screws.  Postoperatively, he was transferred back to [facility] for pain management, physical therapy, and occupational therapy. 



He is still presently nonweightbearing, and Dr. Rush is supposed to see him again

this Wednesday. 



The patient states he is doing well and does not have any much pain.



OBJECTIVE:  VITAL SIGNS:  Today reveal blood pressure this morning 123/60, pulse 82,

respirations 18, O2 saturation 96% on room air, T-max 99.5. 

GENERAL:  This is a well-developed, well-nourished, rather large 70-year-old white

male, in no apparent distress at this time. 

HEENT:  Reveals normocephalic and nontraumatic cranium.  Pupils are equally round

and reactive.  Extraocular movements are intact.  Nose and throat are slightly dry,

but clear. 

NECK:  Supple without masses, nodes, or bruits. 

CHEST:  Clear to auscultation.  No rales, rhonchi, or wheezes are heard.  No cough

is noted. 

HEART:  Reveals a regular rate and rhythm without murmurs, gallops, or rubs. 

ABDOMEN:  Obese, soft, and nontender.  Normal bowel sounds are noted in all 4

quadrants.  No rebound or guarding is noted. 

GENITOURINARY:  Deferred. 

EXTREMITIES:  Reveal no clubbing, cyanosis, or edema.  The patient's left foot is in

a boot immobilizer.  The patient had surgery 5 days ago on 06/24/2020 and is

supposed to see Dr. Rush again this Wednesday. 



ASSESSMENT:  

1. Bimalleolar fracture, status post open reduction and internal fixation by Dr. Rush on 06/24/2020. 

2. Hypertension, which is stable.

3. Diabetes, slightly elevated since surgery, but much improved over the weekend.

The patient is eating much better. 

4. Diabetic neuropathy.

5. Diastolic congestive heart failure by history.

6. Hyperlipidemia.

7. HIV.

8. Generalized weakness.

9. Muscle spasms.



PLAN:  

1. Continue to monitor the patient's blood pressure closely and adjust medications

if needed. 

2. Continue to monitor the patient's diabetes with Accu-Cheks a.c. and at bedtime.

3. Encouraged the patient not to eat many carbs, which he is doing a much better job

at. 

4. Encouraged the patient to continue wear his immobilization boot.

5. Nonweightbearing on the ankle.

6. Continue present medications.

7. Supportive care.

8. Continue physical therapy and occupational therapy.







Job ID:  [id number]

## 2020-06-30 RX ADMIN — INSULIN GLARGINE SCH UNITS: 100 INJECTION, SOLUTION SUBCUTANEOUS at 21:24

## 2020-06-30 RX ADMIN — ASPIRIN SCH MG: 81 TABLET ORAL at 08:49

## 2020-06-30 RX ADMIN — INSULIN GLARGINE SCH UNITS: 100 INJECTION, SOLUTION SUBCUTANEOUS at 08:44

## 2020-06-30 RX ADMIN — INSULIN LISPRO PRN UNIT: 100 INJECTION, SOLUTION INTRAVENOUS; SUBCUTANEOUS at 17:27

## 2020-06-30 NOTE — PRG
DATE OF SERVICE:  06/30/2020



SUBJECTIVE:  Mr. Morrow is a well-developed, well-nourished, very pleasant, 
rather

large 70-year-old white male, who fell at home.  Unfortunately, he had a 
resultant

bimalleolar fracture of the left ankle.  He was transferred to [facility],

where he was seen by Dr. Rush, who put a posterior splint on him.  He was 
thought

to be nonsurgical at that point.  He was taken to [facility] for

physical therapy and occupational therapy.  Approximately three weeks later, Dr. Rush saw the patient again, re-evaluated him, and took him to the surgical 
suite,

where he had an open reduction and internal fixation done, which was last week, 
six

days ago that required a plate and 10 screws.  Postoperatively, the patient was

transferred back to [facility] for pain management, physical

therapy, and occupational therapy, still nonweightbearing. 



The patient states he is doing well.  He is very pleased because he has been

watching his diet, and his sugars yesterday were excellent with only one above 
160. 



PHYSICAL EXAMINATION:

VITAL SIGNS:  Today reveal blood pressure this morning was 134/75, pulse 76,

respirations 18, O2 saturation 95% on room air, and T-max 97.1. 

GENERAL:  This is a well-developed, well-nourished, very pleasant 70-year-old 
white

male, in no apparent distress at this time. 

HEENT:  Reveals normocephalic and nontraumatic cranium.  Pupils are equally 
round

and reactive.  Extraocular movements are intact.  Nose and throat are clear 
without

exudates. 

NECK:  Supple without masses, nodes, or bruits. 

CHEST:  Clear to auscultation.  No rales, rhonchi, wheezes, or cough is heard. 

HEART:  Reveals a regular rate and rhythm without murmurs, gallops, or rubs. 

ABDOMEN:  Obese, soft, nontender without organomegaly.  Normal bowel sounds are

noted.  No rebound or guarding is noted. 

:  Deferred. 

EXTREMITIES:  Reveal no clubbing, cyanosis, or edema.  The patient's  ankle

surgical immobilizer.  The patient's surgery was six on 06/24/2020.  The

patient is supposed to see Dr. Rush again. 



ASSESSMENT:  

1. Bimalleolar fracture, status post open reduction and internal fixation by Dr. Rush on 06/24/2020. 

2. Hypertension.

3. Diabetes, much improved.

4. Diabetic neuropathy.

5. Diastolic congestive heart failure by history.

6. Hyperlipidemia.

7. Human immunodeficiency virus.

8. Generalized weakness.

9. Muscle spasms.



PLAN:  

1. Continue to monitor the patient's blood pressure closely and adjust 
medications

as needed. 

2. Continue to monitor the patient's diabetes with Accu-Cheks a.c. and at 
bedtime.

3. Continue with immobilization boot.

4. Encourage the patient to continue to watch his diet closely.

5. Nonweightbearing status on the ankle.

6. Continue present medications.

7. Supportive care.

8. Continue PT and OT.







Job ID:  [id number]



[facility]

## 2020-07-01 RX ADMIN — INSULIN GLARGINE SCH UNITS: 100 INJECTION, SOLUTION SUBCUTANEOUS at 07:52

## 2020-07-01 RX ADMIN — ASPIRIN SCH MG: 81 TABLET ORAL at 07:56

## 2020-07-01 RX ADMIN — INSULIN GLARGINE SCH UNITS: 100 INJECTION, SOLUTION SUBCUTANEOUS at 21:04

## 2020-07-02 RX ADMIN — INSULIN GLARGINE SCH UNITS: 100 INJECTION, SOLUTION SUBCUTANEOUS at 08:40

## 2020-07-02 RX ADMIN — INSULIN GLARGINE SCH UNITS: 100 INJECTION, SOLUTION SUBCUTANEOUS at 21:06

## 2020-07-02 RX ADMIN — ASPIRIN SCH MG: 81 TABLET ORAL at 08:24

## 2020-07-02 NOTE — PRG
DATE OF SERVICE:  07/02/2020



SUBJECTIVE:  Mr. Morrow is up in bed.  He is happy with his progress.  He denies

any questions or concerns.  No family at bedside, discussed with nursing. 



OBJECTIVE:  VITAL SIGNS:  He is afebrile, heart rate 83, respirations 18, oxygen

saturation 96% on room air, and blood pressure 161/75. 

CARDIOVASCULAR SYSTEM:  S1 and S2 plus. 

RESPIRATORY SYSTEM:  Normal vesicular breath sounds. 

ABDOMEN:  Soft and nontender.  Bowel sounds heard in all quadrants.  Obese.  Ventral

hernia is present. 

EXTREMITIES:  Without cyanosis or clubbing.  Left leg with a walking boot. 

CENTRAL NERVOUS SYSTEM:  Improving deconditioning.



IMPRESSION:  

1. Left ankle fracture, status post surgical fixation.

2. Hypertension.

3. Dyslipidemia.

4. Cirrhosis.

5. Human immunodeficiency virus infection.

6. Ventral hernia as well as umbilical hernia.



PLAN:  

1. Continue current medications.

2. DVT prophylaxis.

3. Decubitus precautions.

4. Orthopedic precautions.

5. Routine laboratory values.

6. Renew medications.

7. Discussed with the patient and nursing in detail and all questions answered.







Job ID:  [id number]

## 2020-07-03 RX ADMIN — INSULIN LISPRO PRN UNIT: 100 INJECTION, SOLUTION INTRAVENOUS; SUBCUTANEOUS at 16:57

## 2020-07-03 RX ADMIN — INSULIN GLARGINE SCH UNITS: 100 INJECTION, SOLUTION SUBCUTANEOUS at 21:14

## 2020-07-03 RX ADMIN — ASPIRIN SCH MG: 81 TABLET ORAL at 08:38

## 2020-07-03 RX ADMIN — INSULIN GLARGINE SCH UNITS: 100 INJECTION, SOLUTION SUBCUTANEOUS at 08:35

## 2020-07-03 NOTE — PRG
DATE OF SERVICE:  07/03/2020



SUBJECTIVE:  Mr. Mororw is doing well and getting ready to participate with

therapy.  His pain is well controlled.  No family at bedside.  Discussed with

nursing. 



OBJECTIVE:  VITAL SIGNS:  He is afebrile, heart rate 77, respirations 18, oxygen

saturation 96% on room air, blood pressure 121/67. 

CARDIOVASCULAR SYSTEM:  S1 and S2 plus. 

RESPIRATORY SYSTEM:  Normal vesicular breath sounds. 

ABDOMEN:  Soft and nontender.  Bowel sounds heard in all quadrants. 

EXTREMITIES:  Without cyanosis or clubbing.  Left leg and foot with walking boot.



IMPRESSION:  

1. Left ankle fracture, status post surgical fixation.

2. Human immunodeficiency virus infection.

3. Cirrhosis.

4. Ventral and umbilical hernia.

5. Hypertension.

6. Dyslipidemia.



PLAN:  

1. Continue current medications.

2. Heart healthy diet.

3. Orthopedic precautions.

4. DVT prophylaxis.

5. Decubitus precautions.

6. Stress ulcer prophylaxis.

7. Continue therapy.

8. Routine laboratory values.  Blood sugars are 130, 117, 88, 73, and 142.







Job ID:  [id number]

## 2020-07-04 RX ADMIN — INSULIN LISPRO PRN UNIT: 100 INJECTION, SOLUTION INTRAVENOUS; SUBCUTANEOUS at 12:20

## 2020-07-04 RX ADMIN — INSULIN GLARGINE SCH UNITS: 100 INJECTION, SOLUTION SUBCUTANEOUS at 08:28

## 2020-07-04 RX ADMIN — INSULIN GLARGINE SCH UNITS: 100 INJECTION, SOLUTION SUBCUTANEOUS at 20:32

## 2020-07-04 RX ADMIN — ASPIRIN SCH MG: 81 TABLET ORAL at 08:26

## 2020-07-04 NOTE — PRG
DATE OF SERVICE:  07/04/2020



SUBJECTIVE:  Mr. Morrow is dozing off, but arousable.  He denies any questions or

concerns.  He is happy with his progress.  Pain is well controlled. 



OBJECTIVE:  VITAL SIGNS:  He is afebrile, heart rate 73, respirations 18, oxygen

saturation 96% on room air, blood pressure 108/63. 

CARDIOVASCULAR SYSTEM:  S1 and S2 plus. 

RESPIRATORY SYSTEM:  Normal vesicular breath sounds. 

ABDOMEN:  Soft and nontender.  Bowel sounds heard in all quadrants. 

EXTREMITIES:  Without cyanosis or clubbing.  Left leg in a walking boot. 

CENTRAL NERVOUS SYSTEM:  Improving deconditioning.



IMPRESSION:  

1. Left ankle fracture, status post surgical fixation.

2. Human immunodeficiency virus infection.

3. Cirrhosis.

4. Depression and anxiety.

5. Diabetes mellitus, type 2.

6. Dyslipidemia.



PLAN:  

1. Continue current medications.

2. 1800 calorie heart healthy ADA diet.

3. Accu-Cheks with sliding scale coverage.

4. Orthopedic precautions.

5. DVT prophylaxis.  He is active enough.

6. Decubitus precaution.

7. Stress ulcer prophylaxis.

8. Physical therapy.

9. Routine laboratory values.





Job ID:  [id number]

## 2020-07-05 RX ADMIN — INSULIN LISPRO PRN UNIT: 100 INJECTION, SOLUTION INTRAVENOUS; SUBCUTANEOUS at 11:21

## 2020-07-05 RX ADMIN — INSULIN GLARGINE SCH UNITS: 100 INJECTION, SOLUTION SUBCUTANEOUS at 20:15

## 2020-07-05 RX ADMIN — INSULIN GLARGINE SCH UNITS: 100 INJECTION, SOLUTION SUBCUTANEOUS at 08:38

## 2020-07-05 RX ADMIN — ASPIRIN SCH MG: 81 TABLET ORAL at 08:32

## 2020-07-05 NOTE — PRG
DATE OF SERVICE:  07/05/2020



SUBJECTIVE:  Mr. Morrow is doing well.  Denies any complaints.  Up in his chair,

happy with his progress.  No family at bedside. 



OBJECTIVE:  VITAL SIGNS:  He is afebrile.  Heart rate 70, respirations 18, oxygen

saturation 96% on room air, blood pressure 118/67. 

CARDIOVASCULAR:  S1 and S2 plus. 

RESPIRATORY:  Normal vesicular breath sounds. 

ABDOMEN:  Soft, nontender.  Bowel sounds heard in all quadrants. 

EXTREMITIES:  Without cyanosis or clubbing.  Peripheral pulses are palpable. 

CENTRAL NERVOUS SYSTEM:  Grossly nonfocal.  Left foot with a walking boot.



IMPRESSION:  

1. Left ankle fracture, status post surgical fixation.

2. Human immunodeficiency virus infection.

3. Cirrhosis.

4. Diabetes mellitus type 2.

5. Dyslipidemia.

6. Depression and anxiety.



PLAN:  

1. Continue current medications.

2. 1800 calorie heart healthy ADA diet.

3. Accu-Cheks with sliding scale coverage.

4. Orthopedic precautions.

5. DVT prophylaxis.  He is active enough that he does not need any.

6. Decubitus precaution.

7. Stress ulcer prophylaxis.

8. Physical therapy.

9. Dr. Carloz villagran tonight.





Job ID:  [id number]

## 2020-07-06 RX ADMIN — ASPIRIN SCH MG: 81 TABLET ORAL at 08:36

## 2020-07-06 RX ADMIN — INSULIN GLARGINE SCH UNITS: 100 INJECTION, SOLUTION SUBCUTANEOUS at 20:36

## 2020-07-06 RX ADMIN — INSULIN GLARGINE SCH UNITS: 100 INJECTION, SOLUTION SUBCUTANEOUS at 08:35

## 2020-07-06 NOTE — PRG
DATE OF SERVICE:  07/06/2020



SUBJECTIVE:  Mr. Morrow is a well-developed, well-nourished 70-year-old white

male.  Unfortunately, he fell at home and had a resultant left ankle bimalleolar

fracture.  He was transferred to [facility] for nonweightbearing on a

posterior splint.  Four weeks later, the patient was seen by Dr. Rush, who took

him to the surgical suite for ORIF.  The patient is doing well and is to have his

staples removed on Wednesday.  Therapy states that he is ready for discharge.  He

has reached maximum benefit here.  We will consult Case Management for getting Home

Health involved. 



OBJECTIVE:  VITAL SIGNS:  Today reveal blood pressure 129/66, pulse 75, respirations

18, O2 saturation 98% on room air, and T-max 95.6. 

GENERAL:  This is a well-developed, well-nourished 70-year-old white male, sitting

in his room exercising with Therapy. 

HEENT:  Normocephalic, nontraumatic cranium.  Pupils equally round and reactive.

Extraocular movements are intact.  Nose and throat are slightly dry, but clear. 

NECK:  Supple without masses, nodes, or bruits. 

CHEST:  Clear to auscultation.  No rales, rhonchi, wheezes, or cough are heard. 

HEART:  Reveals a regular rate and rhythm without murmurs, gallops, or rubs. 

ABDOMEN:  Obese, soft, nontender without organomegaly.  Normal bowel sounds are

noted.  No rebound or guarding is noted. 

:  Deferred. 

EXTREMITIES:  Reveal no clubbing, cyanosis, or edema.  The patient's left ankle

still in an ankle immobilizer.  The patient's surgery was about 10 days ago and he

gets his sutures removed Wednesday, which is 07/08/2020. 



ASSESSMENT:  

1. Bimalleolar fracture, status post open reduction and internal fixation by Dr. Rush on 06/24/2020. 

2. Hypertension.

3. Diabetes.

4. Diabetic neuropathy.

5. Diastolic congestive heart failure by history.

6. Hyperlipidemia.

7. HIV.

8. Generalized weakness.

9. Muscle spasm.



PLAN:  

1. Remove staples on Wednesday.

2. Discharge planning to discharge probably Tuesday or Wednesday or Thursday.

3. Continue to monitor the patient's diabetes with Accu-Cheks before meals and at

bedtime. 

4. Continue immobilization boot until Dr. Rush says he is able to toe-touch or

weight bear. 

5. No weightbearing at this time yet.

6. Encourage the patient to continue watch diet closely.

7. Nonweightbearing status.

8. Continue present medications.

9. Supportive care.

10. Continue PT.

11. Discharge planning.







Job ID:  [id number]

## 2020-07-07 RX ADMIN — INSULIN LISPRO PRN UNIT: 100 INJECTION, SOLUTION INTRAVENOUS; SUBCUTANEOUS at 11:48

## 2020-07-07 RX ADMIN — ASPIRIN SCH MG: 81 TABLET ORAL at 08:30

## 2020-07-07 RX ADMIN — INSULIN GLARGINE SCH UNITS: 100 INJECTION, SOLUTION SUBCUTANEOUS at 08:29

## 2020-07-07 RX ADMIN — INSULIN GLARGINE SCH UNITS: 100 INJECTION, SOLUTION SUBCUTANEOUS at 20:28

## 2020-07-07 NOTE — PRG
DATE OF SERVICE:  07/07/2020



SUBJECTIVE:  Mr. Morrow is a pleasant 70-year-old white male, who was at home when

he fell.  He had a resultant left ankle bimalleolar fracture.  He was transferred to

[facility] where he was seen by Dr. Rush who made him nonweightbearing

for 3 to 4 weeks.  Then, he was taken to the surgical suite for an ORIF done.

Postoperatively, he has actually done very well, but was still nonweightbearing.  He

has reached maximum therapy benefit, and at this time he is ready for discharge.

Case Management is trying to get him home health to go home.  The patient does

fairly well, but he has constant cueing, so he does not put himself in danger.  He

states that he will go home and his sister will stay with him quite a bit.  I did

warn him that if he does not take care of himself and he starts falling that he will

go to a nursing home.  PT and OT both recommended nursing home placement at this

time. 



PHYSICAL EXAMINATION:

VITAL SIGNS:  Today reveal blood pressure of 108/56, pulse 79 to 90, respirations 18

to 20, O2 saturation 93% to 97% on room air, T-max 98.3. 

GENERAL:  This is a well-developed, well-nourished, somewhat obese white male, in no

apparent distress at this time. 

HEENT:  Reveals normocephalic and nontraumatic cranium.  Pupils are equally round

and reactive.  Extraocular movements are intact.  Nose and throat are slightly dry. 

NECK:  Supple without masses, nodes, or bruits. 

CHEST:  Clear to auscultation.  No rales, rhonchi, wheezes, or cough is heard. 

HEART:  Reveals a regular rate and rhythm without murmurs, gallops, or rubs. 

ABDOMEN:  Obese, soft, and nontender.  Normal bowel sounds are noted in all 4

quadrants.  No rebound or guarding is noted. 

:  Deferred. 

EXTREMITIES:  Revealed left ankle still in an ankle immobilizer.  The patient is to

have his stitches removed tomorrow morning, and then he will be discharged.  He will

be going home and will be discharged to the care of his sister who is typically

hovering over him and taking good care of him. 



ASSESSMENT:  

1. Bimalleolar fracture, status post open reduction and internal fixation by Dr. Rush on 06/24/2020. 

2. Hypertension.

3. Diabetes type 2.

4. Diabetic neuropathy.

5. Diastolic congestive heart failure by history.

6. Hyperlipidemia.

7. Human immunodeficiency virus.

8. Generalized weakness.

9. Muscle spasm.



PLAN:  

1. Remove staples tomorrow and then discharge to home.

2. Continue to monitor the patient's diabetes with Accu-Cheks a.c. and at bedtime.

3. Continue immobilization boots as recommended by Dr. Rush until Dr. Rush

gives him toe-touch or weightbearing. 

4. No weightbearing at this time.

5. Encourage the patient to continue to watch his diet closely.

6. Nonweightbearing status re-emphasized with the patient.

7. Continue present medications.

8. Supportive care.

9. Continue PT.

10. Discharge planning for discharge tomorrow morning.







Job ID:  [id number]

## 2020-07-08 VITALS — DIASTOLIC BLOOD PRESSURE: 69 MMHG | TEMPERATURE: 98.4 F | SYSTOLIC BLOOD PRESSURE: 141 MMHG

## 2020-07-08 RX ADMIN — INSULIN GLARGINE SCH UNITS: 100 INJECTION, SOLUTION SUBCUTANEOUS at 08:11

## 2020-07-08 RX ADMIN — ASPIRIN SCH MG: 81 TABLET ORAL at 08:08

## 2020-07-08 NOTE — DIS
DATE OF ADMISSION:  05/26/2020



DATE OF DISCHARGE:  07/08/2020



HOSPITAL COURSE:  Mr. Morrow is a well-developed, very pleasant, slightly obese

70-year-old white male, who was home.  He accidentally fell, twisted his ankle, and

had a resultant left bimalleolar fracture.  He was transferred to [facility], where Dr. Rush made him nonweightbearing and placed a posterior splint

on him.  He transferred him back to [facility] for physical therapy

and occupational therapy and nonweightbearing.  About 3 to 4 weeks later, the

patient was brought back to Dr. Rush's office and Dr. Rush felt that he could

surgically do an open reduction and internal fixation with much better results, that

was accomplished on 06/24/2020.  He was transferred back to [facility] for continued therapy, but still nonweightbearing.  The patient has reached

maximum medical benefit at this time, he is ready to be discharged and will be going

home under the care of his sister and brother-in-law. 



OBJECTIVE:  VITAL SIGNS:  Today reveal blood pressure this morning 141/69, pulse 77,

respirations 18, O2 saturation 95% to 99% on room air, T-max 98.4. 

GENERAL:  This is a well-developed, well-nourished, slightly obese white male, who

is excited about being discharged. 

HEENT:  Normocephalic and nontraumatic cranium.  Pupils are equally round.

Extraocular movements are intact.  Nose and throat are clear. 

RESPIRATORY:  The patient denies any cough, cold, or congestion.  He denies any pain

at all.  Denies any chest pain. 

ABDOMEN:  Obese, soft, nontender. 

EXTREMITIES:  Revealed that the left ankle has had the staples removed.  There is

some widening and we did contact Dr. Rush and he recommended to go ahead and

remove all the staples and put a long Steri-Strips across __________ Steri-Strips

along with either benzoin or other adhesive.  He is to see Dr. Rush in 1 week.

The patient's ankle is much less swollen and he is excited about going home. 



ASSESSMENT:  

1. Bimalleolar fracture, status post open reduction and internal fixation by Dr. Rush on 06/24/2020. 

2. Hypertension.

3. Diabetes type 2.

4. Diabetic neuropathy.

5. Diastolic congestive heart failure by history.

6. Hyperlipidemia.

7. Human immunodeficiency virus.

8. Generalized weakness.

9. Muscle spasms.



PLAN:  

1. Staples have been removed and Steri-Strips have been placed.

2. Dr. Rush's office has been contacted and he recommended to continue removing

his staples, even though he has some slight dehiscence there.  He was Steri-Strip'd

with long Steri-Strips approximately __________ with benzoin.  The patient is still

nonweightbearing on that ankle. 

3. We will continue to encourage the patient to watch his diet closely since he is a

diabetic and most likely at home.  He does a much better job of watching what he

eats. 

4. Continue present medications.

5. Supportive care.

6. Continue outpatient physical therapy.

7. The patient is ready for discharge.



DISCHARGE MEDICATIONS:  Include the following;

1. Tylenol 500 mg q.4 p.r.n. to a max of 3000 mg a day.

2. Amitriptyline 100 mg at bedtime p.r.n. insomnia.

3. Aspirin 81 mg daily.

4. Lipitor 40 mg at bedtime.

5. Carvedilol 12.5 mg b.i.d.

6. Lexapro 20 mg daily.

7. Lasix 40 mg q.a.m.

8. Gabapentin 1200 mg q.p.m.

9. Gabapentin 600 mg q.a.m.

10. Lantus 80 mg b.i.d.

11. Lispro aggressive sliding scale 3 to 13 units before each meal.

12. Meloxicam 15 mg at bedtime.

13. Metformin 500 mg b.i.d. with food.

14. Protonix 40 mg daily.

15. Vascepa 1 g b.i.d.

16. Isentress 400 mg b.i.d.

17. Prezista one pill daily.

18. Ritonavir one pill daily.

19. Rifaximin 550 mg b.i.d.

20. Spironolactone 50 mg q.a.m.

21. Tramadol 50 mg p.r.n. moderate 4 to 6 scaled pain.

22. Tramadol 100 mg for severe pain 7 to 10. 

The patient is waiting on his wheelchair, which should arrive between 2:00 and 4:00.

 The patient has been started on Bactrim most likely DS one p.o. b.i.d. for 10 days

by Dr. Rush's office and that has been called into the [facility] per the

patient's choice. 



The patient will be discharged this afternoon.







Job ID:  [id number]

## 2020-07-09 ENCOUNTER — HOSPITAL ENCOUNTER (EMERGENCY)
Dept: HOSPITAL 18 - NAV ERS | Age: 70
Discharge: HOME | End: 2020-07-09
Payer: MEDICARE

## 2020-07-09 DIAGNOSIS — E78.5: ICD-10-CM

## 2020-07-09 DIAGNOSIS — Z79.84: ICD-10-CM

## 2020-07-09 DIAGNOSIS — Z79.82: ICD-10-CM

## 2020-07-09 DIAGNOSIS — T81.31XA: Primary | ICD-10-CM

## 2020-07-09 DIAGNOSIS — K21.9: ICD-10-CM

## 2020-07-09 DIAGNOSIS — Z79.899: ICD-10-CM

## 2020-07-09 DIAGNOSIS — E11.9: ICD-10-CM

## 2020-07-09 DIAGNOSIS — I10: ICD-10-CM

## 2020-07-09 DIAGNOSIS — Z87.891: ICD-10-CM

## 2020-07-09 DIAGNOSIS — F41.9: ICD-10-CM

## 2020-07-09 DIAGNOSIS — E66.9: ICD-10-CM

## 2020-07-09 DIAGNOSIS — B20: ICD-10-CM

## 2020-07-09 PROCEDURE — 99283 EMERGENCY DEPT VISIT LOW MDM: CPT

## 2020-10-21 ENCOUNTER — HOSPITAL ENCOUNTER (OUTPATIENT)
Dept: HOSPITAL 92 - SCSULT | Age: 70
Discharge: HOME | End: 2020-10-21
Attending: INTERNAL MEDICINE
Payer: MEDICARE

## 2020-10-21 DIAGNOSIS — R16.1: ICD-10-CM

## 2020-10-21 DIAGNOSIS — K72.90: ICD-10-CM

## 2020-10-21 DIAGNOSIS — D50.9: ICD-10-CM

## 2020-10-21 DIAGNOSIS — K74.60: Primary | ICD-10-CM

## 2020-10-21 PROCEDURE — 76705 ECHO EXAM OF ABDOMEN: CPT

## 2020-10-21 NOTE — ULT
HEPATIC DOPPLER ULTRASOUND:

10/21/20

 

COMPARISON: 

5/18/20

 

HISTORY: 

Cirrhosis. 

 

TECHNIQUE:  

Multiplanar gray scale sonographic imaging of the upper abdomen obtained. The hepatic and splenic vas
culature is assessed with Doppler interrogation including color flow and spectral analysis.

 

FINDINGS: 

The imaged IVC and aorta appear grossly unremarkable as does the imaged pancreas. The distal body and
 the tail of the pancreas are obscured by bowel gas.

 

The hepatic parenchyma is heterogeneous and echogenic and demonstrates a peripheral irregular contour
, consistent with the patient's history of cirrhosis. The left hepatic vein and portal vein are paten
t and demonstrate appropriate waveforms. 

 

The main portal vein is patent and demonstrates normal hepatopetal flow. Right portal vein, right hep
atic vein, and middle hepatic vein are patent and demonstrate appropriate waveforms. Hepatic artery i
s patent and demonstrates an appropriate arterial waveform.

 

The common bile duct measures 5 mm, within normal limits. 

 

The gallbladder is surgically absent. The spleen is enlarged measuring 15.6 x 6.6 cm. Splenic artery 
and splenic vein are patent and demonstrate appropriate arterial and venous waveforms respectively. 

 

IMPRESSION: 

Cirrhotic configuration of the liver. Enlarged spleen suggesting portal hypertension. Hepatic and spl
enic vasculature is patent. 

 

POS: AH